# Patient Record
Sex: FEMALE | Race: WHITE | NOT HISPANIC OR LATINO
[De-identification: names, ages, dates, MRNs, and addresses within clinical notes are randomized per-mention and may not be internally consistent; named-entity substitution may affect disease eponyms.]

---

## 2018-11-30 ENCOUNTER — LABORATORY RESULT (OUTPATIENT)
Age: 53
End: 2018-11-30

## 2018-11-30 ENCOUNTER — APPOINTMENT (OUTPATIENT)
Dept: PULMONOLOGY | Facility: CLINIC | Age: 53
End: 2018-11-30
Payer: COMMERCIAL

## 2018-11-30 VITALS
OXYGEN SATURATION: 100 % | SYSTOLIC BLOOD PRESSURE: 109 MMHG | HEIGHT: 65 IN | WEIGHT: 180 LBS | HEART RATE: 75 BPM | DIASTOLIC BLOOD PRESSURE: 75 MMHG | BODY MASS INDEX: 29.99 KG/M2

## 2018-11-30 DIAGNOSIS — Z78.9 OTHER SPECIFIED HEALTH STATUS: ICD-10-CM

## 2018-11-30 DIAGNOSIS — Z87.891 PERSONAL HISTORY OF NICOTINE DEPENDENCE: ICD-10-CM

## 2018-11-30 LAB
ALBUMIN: 10
BILIRUB UR QL STRIP: NEGATIVE
CREATININE: <30
GLUCOSE UR-MCNC: NEGATIVE
HCG UR QL: 0.2 EU/DL
HGB UR QL STRIP.AUTO: NORMAL
KETONES UR-MCNC: NEGATIVE
LEUKOCYTE ESTERASE UR QL STRIP: NORMAL
MICROALBUMIN/CREAT UR TEST STR-RTO: 100
NITRITE UR QL STRIP: NEGATIVE
PH UR STRIP: 6.5
POCT - HEMOGLOBIN (HGB), QUANTITATIVE, TRANSCUTANEOUS: 12.8
PROT UR STRIP-MCNC: NEGATIVE
SP GR UR STRIP: 1.01

## 2018-11-30 PROCEDURE — 81003 URINALYSIS AUTO W/O SCOPE: CPT | Mod: QW

## 2018-11-30 PROCEDURE — 88738 HGB QUANT TRANSCUTANEOUS: CPT

## 2018-11-30 PROCEDURE — 94010 BREATHING CAPACITY TEST: CPT

## 2018-11-30 PROCEDURE — 82044 UR ALBUMIN SEMIQUANTITATIVE: CPT | Mod: QW

## 2018-11-30 PROCEDURE — 94729 DIFFUSING CAPACITY: CPT | Mod: 59

## 2018-11-30 PROCEDURE — 36415 COLL VENOUS BLD VENIPUNCTURE: CPT

## 2018-11-30 PROCEDURE — 93000 ELECTROCARDIOGRAM COMPLETE: CPT

## 2018-11-30 PROCEDURE — 82570 ASSAY OF URINE CREATININE: CPT | Mod: QW

## 2018-11-30 PROCEDURE — 71046 X-RAY EXAM CHEST 2 VIEWS: CPT

## 2018-11-30 PROCEDURE — 99396 PREV VISIT EST AGE 40-64: CPT | Mod: 25

## 2018-11-30 PROCEDURE — 77080 DXA BONE DENSITY AXIAL: CPT

## 2018-11-30 PROCEDURE — 94727 GAS DIL/WSHOT DETER LNG VOL: CPT

## 2018-12-01 LAB
25(OH)D3 SERPL-MCNC: 13 NG/ML
BASOPHILS # BLD AUTO: 0.02 K/UL
BASOPHILS NFR BLD AUTO: 0.3 %
EOSINOPHIL # BLD AUTO: 0.16 K/UL
EOSINOPHIL NFR BLD AUTO: 2.1 %
HBA1C MFR BLD HPLC: 5.3 %
HCT VFR BLD CALC: 42.1 %
HCV AB SER QL: NONREACTIVE
HCV S/CO RATIO: 0.24 S/CO
HGB BLD-MCNC: 13.8 G/DL
IMM GRANULOCYTES NFR BLD AUTO: 0.1 %
LYMPHOCYTES # BLD AUTO: 2.82 K/UL
LYMPHOCYTES NFR BLD AUTO: 37.4 %
MAN DIFF?: NORMAL
MCHC RBC-ENTMCNC: 30.2 PG
MCHC RBC-ENTMCNC: 32.8 GM/DL
MCV RBC AUTO: 92.1 FL
MONOCYTES # BLD AUTO: 0.4 K/UL
MONOCYTES NFR BLD AUTO: 5.3 %
NEUTROPHILS # BLD AUTO: 4.13 K/UL
NEUTROPHILS NFR BLD AUTO: 54.8 %
PLATELET # BLD AUTO: 288 K/UL
RBC # BLD: 4.57 M/UL
RBC # FLD: 13.7 %
T3 SERPL-MCNC: 122 NG/DL
T3RU NFR SERPL: 1.2 INDEX
T4 FREE SERPL-MCNC: 1.1 NG/DL
T4 SERPL-MCNC: 7.8 UG/DL
TSH SERPL-ACNC: 2.56 UIU/ML
WBC # FLD AUTO: 7.54 K/UL

## 2018-12-03 LAB
ALBUMIN SERPL ELPH-MCNC: 4.6 G/DL
ALP BLD-CCNC: 74 U/L
ALT SERPL-CCNC: 17 U/L
ANION GAP SERPL CALC-SCNC: 14 MMOL/L
AST SERPL-CCNC: 13 U/L
BACTERIA UR CULT: ABNORMAL
BILIRUB SERPL-MCNC: 0.2 MG/DL
BUN SERPL-MCNC: 9 MG/DL
CALCIUM SERPL-MCNC: 9.3 MG/DL
CHLORIDE SERPL-SCNC: 103 MMOL/L
CHOLEST SERPL-MCNC: 244 MG/DL
CHOLEST/HDLC SERPL: 4.6 RATIO
CO2 SERPL-SCNC: 26 MMOL/L
CREAT SERPL-MCNC: 0.7 MG/DL
GLUCOSE SERPL-MCNC: 86 MG/DL
HDLC SERPL-MCNC: 53 MG/DL
LDLC SERPL CALC-MCNC: 159 MG/DL
POTASSIUM SERPL-SCNC: 4.6 MMOL/L
PROT SERPL-MCNC: 7.2 G/DL
SODIUM SERPL-SCNC: 143 MMOL/L
TRIGL SERPL-MCNC: 161 MG/DL

## 2018-12-14 ENCOUNTER — APPOINTMENT (OUTPATIENT)
Dept: PULMONOLOGY | Facility: CLINIC | Age: 53
End: 2018-12-14
Payer: COMMERCIAL

## 2018-12-14 ENCOUNTER — RESULT CHARGE (OUTPATIENT)
Age: 53
End: 2018-12-14

## 2018-12-14 PROCEDURE — 95800 SLP STDY UNATTENDED: CPT

## 2018-12-15 PROCEDURE — 95800 SLP STDY UNATTENDED: CPT

## 2018-12-17 ENCOUNTER — FORM ENCOUNTER (OUTPATIENT)
Age: 53
End: 2018-12-17

## 2018-12-21 ENCOUNTER — APPOINTMENT (OUTPATIENT)
Dept: PULMONOLOGY | Facility: CLINIC | Age: 53
End: 2018-12-21
Payer: COMMERCIAL

## 2018-12-21 VITALS
HEART RATE: 77 BPM | RESPIRATION RATE: 12 BRPM | OXYGEN SATURATION: 97 % | TEMPERATURE: 97.8 F | DIASTOLIC BLOOD PRESSURE: 79 MMHG | SYSTOLIC BLOOD PRESSURE: 115 MMHG

## 2018-12-21 PROCEDURE — 99214 OFFICE O/P EST MOD 30 MIN: CPT

## 2019-03-04 ENCOUNTER — RESULT REVIEW (OUTPATIENT)
Age: 54
End: 2019-03-04

## 2019-03-06 ENCOUNTER — APPOINTMENT (OUTPATIENT)
Dept: PULMONOLOGY | Facility: CLINIC | Age: 54
End: 2019-03-06
Payer: COMMERCIAL

## 2019-03-06 ENCOUNTER — NON-APPOINTMENT (OUTPATIENT)
Age: 54
End: 2019-03-06

## 2019-03-06 VITALS
OXYGEN SATURATION: 99 % | TEMPERATURE: 97.7 F | SYSTOLIC BLOOD PRESSURE: 100 MMHG | HEART RATE: 107 BPM | DIASTOLIC BLOOD PRESSURE: 69 MMHG

## 2019-03-06 DIAGNOSIS — Z01.818 ENCOUNTER FOR OTHER PREPROCEDURAL EXAMINATION: ICD-10-CM

## 2019-03-06 LAB
BASOPHILS # BLD AUTO: 0.03 K/UL
BASOPHILS NFR BLD AUTO: 0.5 %
EOSINOPHIL # BLD AUTO: 0.17 K/UL
EOSINOPHIL NFR BLD AUTO: 2.8 %
HCT VFR BLD CALC: 41.6 %
HGB BLD-MCNC: 13.3 G/DL
IMM GRANULOCYTES NFR BLD AUTO: 0.3 %
LYMPHOCYTES # BLD AUTO: 1.85 K/UL
LYMPHOCYTES NFR BLD AUTO: 30.6 %
MAN DIFF?: NORMAL
MCHC RBC-ENTMCNC: 28.8 PG
MCHC RBC-ENTMCNC: 32 GM/DL
MCV RBC AUTO: 90 FL
MONOCYTES # BLD AUTO: 0.33 K/UL
MONOCYTES NFR BLD AUTO: 5.5 %
NEUTROPHILS # BLD AUTO: 3.65 K/UL
NEUTROPHILS NFR BLD AUTO: 60.3 %
PLATELET # BLD AUTO: 273 K/UL
RBC # BLD: 4.62 M/UL
RBC # FLD: 13.1 %
WBC # FLD AUTO: 6.05 K/UL

## 2019-03-06 PROCEDURE — 36415 COLL VENOUS BLD VENIPUNCTURE: CPT

## 2019-03-06 PROCEDURE — 99214 OFFICE O/P EST MOD 30 MIN: CPT | Mod: 25

## 2019-03-06 PROCEDURE — 93000 ELECTROCARDIOGRAM COMPLETE: CPT

## 2019-03-06 NOTE — PHYSICAL EXAM
[General Appearance - Well Developed] : well developed [Normal Appearance] : normal appearance [Well Groomed] : well groomed [General Appearance - Well Nourished] : well nourished [No Deformities] : no deformities [General Appearance - In No Acute Distress] : no acute distress [Normal Conjunctiva] : the conjunctiva exhibited no abnormalities [Eyelids - No Xanthelasma] : the eyelids demonstrated no xanthelasmas [Normal Oropharynx] : normal oropharynx [II] : II [Neck Appearance] : the appearance of the neck was normal [Neck Cervical Mass (___cm)] : no neck mass was observed [Jugular Venous Distention Increased] : there was no jugular-venous distention [Thyroid Diffuse Enlargement] : the thyroid was not enlarged [Thyroid Nodule] : there were no palpable thyroid nodules [Heart Rate And Rhythm] : heart rate and rhythm were normal [Heart Sounds] : normal S1 and S2 [Murmurs] : no murmurs present [Arterial Pulses Normal] : the arterial pulses were normal [Edema] : no peripheral edema present [Veins - Varicosity Changes] : no varicosital changes were noted in the lower extremities [Respiration, Rhythm And Depth] : normal respiratory rhythm and effort [Exaggerated Use Of Accessory Muscles For Inspiration] : no accessory muscle use [Auscultation Breath Sounds / Voice Sounds] : lungs were clear to auscultation bilaterally [Chest Palpation] : palpation of the chest revealed no abnormalities [Lungs Percussion] : the lungs were normal to percussion [Bowel Sounds] : normal bowel sounds [Abdomen Soft] : soft [Abdomen Tenderness] : non-tender [Abdomen Mass (___ Cm)] : no abdominal mass palpated [Abnormal Walk] : normal gait [Gait - Sufficient For Exercise Testing] : the gait was sufficient for exercise testing [Nail Clubbing] : no clubbing of the fingernails [Cyanosis, Localized] : no localized cyanosis [Petechial Hemorrhages (___cm)] : no petechial hemorrhages [] : no ischemic changes [Deep Tendon Reflexes (DTR)] : deep tendon reflexes were 2+ and symmetric [Sensation] : the sensory exam was normal to light touch and pinprick [Motor Exam] : the motor exam was normal [No Focal Deficits] : no focal deficits [Oriented To Time, Place, And Person] : oriented to person, place, and time [Impaired Insight] : insight and judgment were intact [Affect] : the affect was normal [Mood] : the mood was normal

## 2019-03-07 LAB
25(OH)D3 SERPL-MCNC: 59.3 NG/ML
ANION GAP SERPL CALC-SCNC: 15 MMOL/L
BUN SERPL-MCNC: 17 MG/DL
CALCIUM SERPL-MCNC: 9.6 MG/DL
CHLORIDE SERPL-SCNC: 102 MMOL/L
CO2 SERPL-SCNC: 22 MMOL/L
CREAT SERPL-MCNC: 0.7 MG/DL
GLUCOSE SERPL-MCNC: 104 MG/DL
POTASSIUM SERPL-SCNC: 4.6 MMOL/L
SODIUM SERPL-SCNC: 139 MMOL/L

## 2019-03-07 NOTE — DISCUSSION/SUMMARY
[FreeTextEntry1] : Patient was stereotactic breast biopsy completed 2/6/2019 line diagnosis for biopsy\par Minute focus of atypical ductal hyperplasia\par Fibrocystic change nonproliferative with microcalcification biopsy fibrocystic change nonproliferative\par  follow up with Gabriella Solitario MD breast surgery left pending \par Procedure March 14 2019\par \par \par \par ADDENDUM\par Breast biopsy pathology\par Nonproliferative fibrocystic change\par \par Insomina\par  Vit D insufficiency\par HLD\par \par SMA7 CBC Vitamin D level reviewed\par \par Patient is medically cleared for scheduled breast surgery

## 2019-03-07 NOTE — REASON FOR VISIT
[Preoperative Evaluation] : an preoperative evaluation [FreeTextEntry2] : Atypical Breast Hyperplasia Left

## 2019-03-07 NOTE — PROCEDURE
[FreeTextEntry1] : PFT no bronchodilator November 30, 2018\par Spirometry normal\par Lung volumes normal\par Diffusion normal\par Hemoglobin 12.8\par \par DEXA normal study follow-up 2 years\par Chest x-ray PA lateral projection November 30, 2018\par Cardiac size is normal\par Full paratracheal stripe\par Lung fields are clear\par No dominant pulmonary nodules parenchymal infiltrates pleural effusions pneumothorax\par Soft tissue bony structures normal\par \par EKG 3/6/2019\par  NSR\par  low precordial voltage\par Blood draw\par \par Data review March 7, 2019\par CBC normal range\par Serum electrolytes glucose 104\par BUN 19 creatinine 0.70 serum calcium 9.6 her potassium 4.6\par Vitamin D 59.3 normal range

## 2019-03-07 NOTE — HISTORY OF PRESENT ILLNESS
[Primary Insomnia] : primary insomnia [Date: ___] : Date of most recent diagnostic polysomnogram: [unfilled] [AHI: ___ per hour] : Apnea-hypopnea index:  [unfilled] per hour [FreeTextEntry1] : Preop; Breast surgery\par insomina\par  hx negative sleep study for [facility]\par completed Vit treatment

## 2019-03-14 ENCOUNTER — RESULT REVIEW (OUTPATIENT)
Age: 54
End: 2019-03-14

## 2019-03-22 ENCOUNTER — APPOINTMENT (OUTPATIENT)
Dept: PULMONOLOGY | Facility: CLINIC | Age: 54
End: 2019-03-22
Payer: COMMERCIAL

## 2019-03-22 VITALS
OXYGEN SATURATION: 94 % | SYSTOLIC BLOOD PRESSURE: 98 MMHG | TEMPERATURE: 98.1 F | HEART RATE: 87 BPM | RESPIRATION RATE: 16 BRPM | DIASTOLIC BLOOD PRESSURE: 63 MMHG

## 2019-03-22 PROCEDURE — 99213 OFFICE O/P EST LOW 20 MIN: CPT | Mod: 25

## 2019-03-22 PROCEDURE — 36415 COLL VENOUS BLD VENIPUNCTURE: CPT

## 2019-03-22 PROCEDURE — 71046 X-RAY EXAM CHEST 2 VIEWS: CPT

## 2019-03-23 LAB
ANION GAP SERPL CALC-SCNC: 13 MMOL/L
BASOPHILS # BLD AUTO: 0.04 K/UL
BASOPHILS NFR BLD AUTO: 0.5 %
BUN SERPL-MCNC: 12 MG/DL
CALCIUM SERPL-MCNC: 9.9 MG/DL
CHLORIDE SERPL-SCNC: 102 MMOL/L
CO2 SERPL-SCNC: 26 MMOL/L
CREAT SERPL-MCNC: 0.72 MG/DL
DEPRECATED D DIMER PPP IA-ACNC: <150 NG/ML DDU
EOSINOPHIL # BLD AUTO: 0.28 K/UL
EOSINOPHIL NFR BLD AUTO: 3.8 %
GLUCOSE SERPL-MCNC: 78 MG/DL
HCT VFR BLD CALC: 44.5 %
HGB BLD-MCNC: 14 G/DL
IMM GRANULOCYTES NFR BLD AUTO: 0.3 %
LYMPHOCYTES # BLD AUTO: 2.5 K/UL
LYMPHOCYTES NFR BLD AUTO: 33.8 %
MAN DIFF?: NORMAL
MCHC RBC-ENTMCNC: 29.2 PG
MCHC RBC-ENTMCNC: 31.5 GM/DL
MCV RBC AUTO: 92.9 FL
MONOCYTES # BLD AUTO: 0.41 K/UL
MONOCYTES NFR BLD AUTO: 5.5 %
NEUTROPHILS # BLD AUTO: 4.14 K/UL
NEUTROPHILS NFR BLD AUTO: 56.1 %
PLATELET # BLD AUTO: 336 K/UL
POTASSIUM SERPL-SCNC: 4.5 MMOL/L
RBC # BLD: 4.79 M/UL
RBC # FLD: 13 %
SODIUM SERPL-SCNC: 141 MMOL/L
WBC # FLD AUTO: 7.39 K/UL

## 2019-03-23 NOTE — DISCUSSION/SUMMARY
[FreeTextEntry1] : Patient was stereotactic breast biopsy completed 2/6/2019 diagnosis for biopsy\par Minute focus of atypical ductal hyperplasia\par Fibrocystic change nonproliferative with microcalcification biopsy fibrocystic change nonproliferative\par \par ADDENDUM\par Breast biopsy pathology\par Nonproliferative fibrocystic change\par \par post Op atypical left chest wall vs  breast \par no evidence of breast infection or palpable hematoma\par veery low suspicion PE\par  but will check D Dimer\par \par \par

## 2019-03-23 NOTE — REASON FOR VISIT
[Follow-Up] : a follow-up visit [FreeTextEntry2] : Post Op breast surgery Vit D julianne,Sleep Disorder

## 2019-03-23 NOTE — HISTORY OF PRESENT ILLNESS
[Improved] : have improved [None] : The patient is not currently on any medications [Frequent Nocturnal Awakening] : frequent nocturnal awakening [Daytime Somnolence] : daytime somnolence [Unintentional Sleep While Inactive] : unintentional sleep while inactive [Awakes Unrefreshed] : awakening unrefreshed [Awakes with Headache] : headache upon awakening [Awakening With Dry Mouth] : awakening with dry mouth [Snoring] : no snoring [Witnessed Apneas] : no witnessed sleep apnea [FreeTextEntry1] : very atypical fleeting type sharp pain at left lateral breast \par  not pleuritic in  nature \par  No SOB RIVERO palpitations\par site of discomfort not at site of breast surgery

## 2019-03-23 NOTE — PROCEDURE
[FreeTextEntry1] : PFT no bronchodilator November 30, 2018\par Spirometry normal\par Lung volumes normal\par Diffusion normal\par Hemoglobin 12.8\par \par DEXA normal study follow-up 2 years\par \par Chest x-ray PA lateral projection 3/22/2019\par Cardiac size is normal\par Full paratracheal stripe\par Lung fields are clear\par No dominant pulmonary nodules parenchymal infiltrates pleural effusions pneumothorax\par Soft tissue bony structures normal\par \par EKG 3/6/2019\par  NSR\par  low precordial voltage\par  Blod draw\par  CBC SMA7 D Dimer\par Data review March 7, 2019\par CBC normal range\par Serum electrolytes glucose 104\par BUN 19 creatinine 0.70 serum calcium 9.6 her potassium 4.6\par Vitamin D 59.3 normal range\par \par DATA review 2/23/2019\par CBC normal range\par D-dimer negative less than 150\par Serum electrolytes normal\par BUN 12 creatinine 0.72

## 2019-04-26 ENCOUNTER — APPOINTMENT (OUTPATIENT)
Dept: PULMONOLOGY | Facility: CLINIC | Age: 54
End: 2019-04-26

## 2019-05-16 ENCOUNTER — TRANSCRIPTION ENCOUNTER (OUTPATIENT)
Age: 54
End: 2019-05-16

## 2019-05-16 ENCOUNTER — APPOINTMENT (OUTPATIENT)
Dept: GASTROENTEROLOGY | Facility: CLINIC | Age: 54
End: 2019-05-16
Payer: COMMERCIAL

## 2019-05-16 VITALS
WEIGHT: 190 LBS | BODY MASS INDEX: 31.65 KG/M2 | HEIGHT: 65 IN | SYSTOLIC BLOOD PRESSURE: 107 MMHG | HEART RATE: 81 BPM | DIASTOLIC BLOOD PRESSURE: 82 MMHG

## 2019-05-16 DIAGNOSIS — Z80.52 FAMILY HISTORY OF MALIGNANT NEOPLASM OF BLADDER: ICD-10-CM

## 2019-05-16 DIAGNOSIS — Z12.11 ENCOUNTER FOR SCREENING FOR MALIGNANT NEOPLASM OF COLON: ICD-10-CM

## 2019-05-16 DIAGNOSIS — Z82.3 FAMILY HISTORY OF STROKE: ICD-10-CM

## 2019-05-16 DIAGNOSIS — Z82.49 FAMILY HISTORY OF ISCHEMIC HEART DISEASE AND OTHER DISEASES OF THE CIRCULATORY SYSTEM: ICD-10-CM

## 2019-05-16 DIAGNOSIS — K58.0 IRRITABLE BOWEL SYNDROME WITH DIARRHEA: ICD-10-CM

## 2019-05-16 DIAGNOSIS — Z80.51 FAMILY HISTORY OF MALIGNANT NEOPLASM OF KIDNEY: ICD-10-CM

## 2019-05-16 DIAGNOSIS — Z83.3 FAMILY HISTORY OF DIABETES MELLITUS: ICD-10-CM

## 2019-05-16 PROCEDURE — 99244 OFF/OP CNSLTJ NEW/EST MOD 40: CPT

## 2019-05-16 PROCEDURE — 82274 ASSAY TEST FOR BLOOD FECAL: CPT | Mod: QW

## 2019-05-16 NOTE — ASSESSMENT
[FreeTextEntry1] : 1. Reportedly negative colonoscopy approximately 2009--rule out colorectal neoplasm. Occasional urgency, loose stools likely from IBS.\par 2. Chronic GERD--rule out erosive esophagitis, Chawla's, hiatal hernia.\par 3. Obesity.\par 4. Hyperlipidemia.\par 5. Ex-smoker.\par 6. Insomnia.\par 7. Status post cholecystectomy.\par \par Plan:\par 1. Medical records, including labs, reviewed.\par 2. [facility] brochure on "GERD" given and discussed.\par 3. Agree with screening colonoscopy, with EGD to follow-- Procedures, rationale, alternatives, material risks, anesthesia plan, MiraLax prep instructions were reviewed and brochures given.\par 4. Other recommendations depend on endoscopy results.

## 2019-05-16 NOTE — PHYSICAL EXAM
[General Appearance - Alert] : alert [General Appearance - Well Nourished] : well nourished [General Appearance - In No Acute Distress] : in no acute distress [General Appearance - Well Developed] : well developed [Outer Ear] : the ears and nose were normal in appearance [Sclera] : the sclera and conjunctiva were normal [Oropharynx] : the oropharynx was normal [Neck Cervical Mass (___cm)] : no neck mass was observed [Neck Appearance] : the appearance of the neck was normal [Jugular Venous Distention Increased] : there was no jugular-venous distention [Thyroid Diffuse Enlargement] : the thyroid was not enlarged [Thyroid Nodule] : there were no palpable thyroid nodules [Heart Sounds] : normal S1 and S2 [Heart Rate And Rhythm] : heart rate was normal and rhythm regular [Auscultation Breath Sounds / Voice Sounds] : lungs were clear to auscultation bilaterally [Heart Sounds Gallop] : no gallops [Murmurs] : no murmurs [Heart Sounds Pericardial Friction Rub] : no pericardial rub [Full Pulse] : the pedal pulses are present [Edema] : there was no peripheral edema [Bowel Sounds] : normal bowel sounds [Abdomen Soft] : soft [Abdomen Tenderness] : non-tender [Abdomen Mass (___ Cm)] : no abdominal mass palpated [Abdomen Hernia] : no hernia was discovered [Normal Sphincter Tone] : normal sphincter tone [No Rectal Mass] : no rectal mass [Internal Hemorrhoid] : no internal hemorrhoids [Occult Blood Positive] : stool was negative for occult blood [External Hemorrhoid] : no external hemorrhoids [FreeTextEntry1] : FIT negative [Cervical Lymph Nodes Enlarged Posterior Bilaterally] : posterior cervical [Axillary Lymph Nodes Enlarged Bilaterally] : axillary [Cervical Lymph Nodes Enlarged Anterior Bilaterally] : anterior cervical [Supraclavicular Lymph Nodes Enlarged Bilaterally] : supraclavicular [Inguinal Lymph Nodes Enlarged Bilaterally] : inguinal [Femoral Lymph Nodes Enlarged Bilaterally] : femoral [No CVA Tenderness] : no ~M costovertebral angle tenderness [Abnormal Walk] : normal gait [No Spinal Tenderness] : no spinal tenderness [Musculoskeletal - Swelling] : no joint swelling seen [Nail Clubbing] : no clubbing  or cyanosis of the fingernails [Motor Tone] : muscle strength and tone were normal [Skin Color & Pigmentation] : normal skin color and pigmentation [Skin Turgor] : normal skin turgor [Impaired Insight] : insight and judgment were intact [Oriented To Time, Place, And Person] : oriented to person, place, and time [] : no rash [Affect] : the affect was normal

## 2019-05-16 NOTE — HISTORY OF PRESENT ILLNESS
[FreeTextEntry1] : Baseline colonoscopy approximately 10 years ago was reportedly negative. Mignon is more likely to experience fecal urgency and loose stools after dining out at a restaurant; her bowels are otherwise generally regular. She has had long-standing reflux and indigestion, at least twice per week, for which she takes Phazyme as needed. She has never undergone endoscopic evaluation.

## 2019-05-16 NOTE — CONSULT LETTER
[Consult Letter:] : I had the pleasure of evaluating your patient, [unfilled]. [Dear  ___] : Dear  [unfilled], [Please see my note below.] : Please see my note below. [Consult Closing:] : Thank you very much for allowing me to participate in the care of this patient.  If you have any questions, please do not hesitate to contact me. [Sincerely,] : Sincerely, [FreeTextEntry3] : Isak Lane M.D.\par

## 2019-05-16 NOTE — REVIEW OF SYSTEMS
[Chills] : chills [Fever] : fever [Diarrhea] : diarrhea [Feeling Tired] : feeling tired [Heartburn] : heartburn [Joint Swelling] : joint swelling [Joint Pain] : joint pain [Joint Stiffness] : joint stiffness [Negative] : Heme/Lymph [As Noted in HPI] : as noted in HPI

## 2019-05-23 ENCOUNTER — APPOINTMENT (OUTPATIENT)
Dept: PULMONOLOGY | Facility: CLINIC | Age: 54
End: 2019-05-23
Payer: COMMERCIAL

## 2019-05-23 VITALS
HEART RATE: 95 BPM | SYSTOLIC BLOOD PRESSURE: 129 MMHG | OXYGEN SATURATION: 99 % | DIASTOLIC BLOOD PRESSURE: 78 MMHG | TEMPERATURE: 99.5 F

## 2019-05-23 DIAGNOSIS — Z87.09 PERSONAL HISTORY OF OTHER DISEASES OF THE RESPIRATORY SYSTEM: ICD-10-CM

## 2019-05-23 LAB — S PYO AG SPEC QL IA: NORMAL

## 2019-05-23 PROCEDURE — 94060 EVALUATION OF WHEEZING: CPT

## 2019-05-23 PROCEDURE — 71046 X-RAY EXAM CHEST 2 VIEWS: CPT

## 2019-05-23 PROCEDURE — 95012 NITRIC OXIDE EXP GAS DETER: CPT

## 2019-05-23 PROCEDURE — 87880 STREP A ASSAY W/OPTIC: CPT | Mod: QW

## 2019-05-23 PROCEDURE — 94664 DEMO&/EVAL PT USE INHALER: CPT | Mod: 59

## 2019-05-23 PROCEDURE — 99214 OFFICE O/P EST MOD 30 MIN: CPT | Mod: 25

## 2019-05-23 RX ORDER — ALBUTEROL SULFATE 90 UG/1
108 (90 BASE) AEROSOL, METERED RESPIRATORY (INHALATION)
Qty: 1 | Refills: 3 | Status: ACTIVE | COMMUNITY
Start: 2019-05-23 | End: 1900-01-01

## 2019-05-23 NOTE — HISTORY OF PRESENT ILLNESS
[Improved] : have improved [Cough] : worsened coughing [Wheezing] : worsened wheezing [None] : The patient is not currently on any medications [FreeTextEntry1] : Productive cough, runny nose, loss of voice, and sore throat x 2 weeks. Taking Day/Night Quil which offered minimal relief.

## 2019-05-23 NOTE — PROCEDURE
[FreeTextEntry1] : Rapid strep negative\par Ergometry May 23, 2019\par Mild obstructive ventilatory impairment\par Borderline 10% response to inhaled bronchodilator at the FEV1\par Flow rates compared to November 30, 2018 demonstrate a significant reduction with the FEV1 reduce from 2.68 to 1.89 L\par \par Chest x-ray PA lateral projection May 23, 2019\par Cardiac size normal\par Left lower lung zone discoid linear atelectasis\par Clear lungs without evidence for pneumonia\par No parenchymal infiltrates pleural effusions dominant pulmonary nodules\par Data chest x-ray of March 22, 2019 the left lower lung zone discoid atelectasis is new\par \par PFT no bronchodilator November 30, 2018\par Spirometry normal\par Lung volumes normal\par Diffusion normal\par Hemoglobin 12.8\par \par DEXA normal study follow-up 2 years\par \par Chest x-ray PA lateral projection 3/22/2019\par Cardiac size is normal\par Full paratracheal stripe\par Lung fields are clear\par No dominant pulmonary nodules parenchymal infiltrates pleural effusions pneumothorax\par Soft tissue bony structures normal\par \par EKG 3/6/2019\par  NSR\par  low precordial voltage\par  Blod draw\par  CBC SMA7 D Dimer\par Data review March 7, 2019\par CBC normal range\par Serum electrolytes glucose 104\par BUN 19 creatinine 0.70 serum calcium 9.6 her potassium 4.6\par Vitamin D 59.3 normal range\par \par DATA review 2/23/2019\par CBC normal range\par D-dimer negative less than 150\par Serum electrolytes normal\par BUN 12 creatinine 0.72

## 2019-06-03 ENCOUNTER — APPOINTMENT (OUTPATIENT)
Dept: GASTROENTEROLOGY | Facility: CLINIC | Age: 54
End: 2019-06-03
Payer: COMMERCIAL

## 2019-06-03 ENCOUNTER — LABORATORY RESULT (OUTPATIENT)
Age: 54
End: 2019-06-03

## 2019-06-03 ENCOUNTER — RX CHANGE (OUTPATIENT)
Age: 54
End: 2019-06-03

## 2019-06-03 PROCEDURE — 45380 COLONOSCOPY AND BIOPSY: CPT

## 2019-06-03 PROCEDURE — 43239 EGD BIOPSY SINGLE/MULTIPLE: CPT | Mod: 59

## 2019-06-03 PROCEDURE — 84703 CHORIONIC GONADOTROPIN ASSAY: CPT | Mod: 59,QW

## 2019-06-03 RX ORDER — AZITHROMYCIN 250 MG/1
250 TABLET, FILM COATED ORAL DAILY
Qty: 10 | Refills: 0 | Status: DISCONTINUED | COMMUNITY
Start: 2019-05-23 | End: 2019-06-03

## 2019-07-03 ENCOUNTER — APPOINTMENT (OUTPATIENT)
Dept: PULMONOLOGY | Facility: CLINIC | Age: 54
End: 2019-07-03
Payer: COMMERCIAL

## 2019-07-03 VITALS — DIASTOLIC BLOOD PRESSURE: 71 MMHG | OXYGEN SATURATION: 98 % | HEART RATE: 76 BPM | SYSTOLIC BLOOD PRESSURE: 104 MMHG

## 2019-07-03 VITALS — OXYGEN SATURATION: 98 % | RESPIRATION RATE: 14 BRPM

## 2019-07-03 PROCEDURE — 94060 EVALUATION OF WHEEZING: CPT

## 2019-07-03 PROCEDURE — 94727 GAS DIL/WSHOT DETER LNG VOL: CPT

## 2019-07-03 PROCEDURE — 99213 OFFICE O/P EST LOW 20 MIN: CPT | Mod: 25

## 2019-07-03 PROCEDURE — 95012 NITRIC OXIDE EXP GAS DETER: CPT

## 2019-07-03 PROCEDURE — 94729 DIFFUSING CAPACITY: CPT

## 2019-07-03 RX ORDER — METHYLPREDNISOLONE 4 MG/1
4 TABLET ORAL
Qty: 1 | Refills: 0 | Status: DISCONTINUED | COMMUNITY
Start: 2019-05-23 | End: 2019-07-03

## 2019-07-03 RX ORDER — BENZONATATE 100 MG/1
100 CAPSULE ORAL
Qty: 30 | Refills: 3 | Status: DISCONTINUED | COMMUNITY
Start: 2019-05-23 | End: 2019-07-03

## 2019-07-03 NOTE — PHYSICAL EXAM
[Normal Appearance] : normal appearance [General Appearance - Well Developed] : well developed [Well Groomed] : well groomed [General Appearance - In No Acute Distress] : no acute distress [General Appearance - Well Nourished] : well nourished [No Deformities] : no deformities [Normal Conjunctiva] : the conjunctiva exhibited no abnormalities [Eyelids - No Xanthelasma] : the eyelids demonstrated no xanthelasmas [II] : II [Normal Oropharynx] : normal oropharynx [Neck Appearance] : the appearance of the neck was normal [Neck Cervical Mass (___cm)] : no neck mass was observed [Jugular Venous Distention Increased] : there was no jugular-venous distention [Thyroid Diffuse Enlargement] : the thyroid was not enlarged [Thyroid Nodule] : there were no palpable thyroid nodules [Heart Rate And Rhythm] : heart rate and rhythm were normal [Murmurs] : no murmurs present [Heart Sounds] : normal S1 and S2 [Edema] : no peripheral edema present [Veins - Varicosity Changes] : no varicosital changes were noted in the lower extremities [Arterial Pulses Normal] : the arterial pulses were normal [Respiration, Rhythm And Depth] : normal respiratory rhythm and effort [Chest Palpation] : palpation of the chest revealed no abnormalities [Auscultation Breath Sounds / Voice Sounds] : lungs were clear to auscultation bilaterally [Exaggerated Use Of Accessory Muscles For Inspiration] : no accessory muscle use [Lungs Percussion] : the lungs were normal to percussion [Bowel Sounds] : normal bowel sounds [Abdomen Tenderness] : non-tender [Abdomen Soft] : soft [Abdomen Mass (___ Cm)] : no abdominal mass palpated [Abnormal Walk] : normal gait [Gait - Sufficient For Exercise Testing] : the gait was sufficient for exercise testing [Cyanosis, Localized] : no localized cyanosis [Nail Clubbing] : no clubbing of the fingernails [Petechial Hemorrhages (___cm)] : no petechial hemorrhages [Sensation] : the sensory exam was normal to light touch and pinprick [] : no ischemic changes [Deep Tendon Reflexes (DTR)] : deep tendon reflexes were 2+ and symmetric [Motor Exam] : the motor exam was normal [No Focal Deficits] : no focal deficits [Oriented To Time, Place, And Person] : oriented to person, place, and time [Affect] : the affect was normal [Impaired Insight] : insight and judgment were intact [Mood] : the mood was normal

## 2019-07-03 NOTE — HISTORY OF PRESENT ILLNESS
[Cough] : worsened coughing [Improved] : have improved [None] : The patient is not currently on any medications [Wheezing] : worsened wheezing [Frequent Nocturnal Awakening] : frequent nocturnal awakening [Daytime Somnolence] : daytime somnolence [Unintentional Sleep While Inactive] : unintentional sleep while inactive [Awakes Unrefreshed] : awakening unrefreshed [Awakes with Headache] : headache upon awakening [Awakening With Dry Mouth] : awakening with dry mouth [Snoring] : no snoring [Witnessed Apneas] : no witnessed sleep apnea [FreeTextEntry1] : minimal residual cough only with deep breath \par completed colonoscopy EGD without respiratory complications\par no wheeze\par  Denies sputum\par  No fever chills sweats

## 2019-07-03 NOTE — DISCUSSION/SUMMARY
[FreeTextEntry1] : Patient was stereotactic breast biopsy completed 2/6/2019 line diagnosis for biopsy\par Minute focus of atypical ductal hyperplasia\par Fibrocystic change nonproliferative with microcalcification biopsy fibrocystic change nonproliferative\par \par ADDENDUM\par Breast biopsy pathology\par Nonproliferative fibrocystic change\par Status post, post viral bronchitic syndrome \par And to veil improvement of pulmonary flow rates as noted\par Discoid atelectasis left lower lung zone\par \par Treatment protocol completed\par Doxycycline 100 mg 1 tablet p.o. twice daily\par Medrol Dosepak\par PRN Pro Air rescue inhaler to be used\par

## 2019-07-03 NOTE — PROCEDURE
[FreeTextEntry1] : Exhaled nitric oxide 18 to be be within normal limits\par PFT July 3, 2019\par Flow rates normal\par Ratio 76\par No response to bronchodilator at FEV1\par Normal lung volumes without air trapping\par Diffusion normal 89% predicted\par Hemoglobin 13.3\par Significant interval improvement of flow rates\par FEV1 improved from 1.89 to 2.75 L\par FVC improving 2.7 63.60 L\par \par Chest x-ray PA lateral projection May 23, 2019\par Cardiac size normal\par Left lower lung zone discoid linear atelectasis\par Clear lungs without evidence for pneumonia\par No parenchymal infiltrates pleural effusions dominant pulmonary nodules\par Data chest x-ray of March 22, 2019 the left lower lung zone discoid atelectasis is new\par \par \par DEXA normal study follow-up 2 years Nov 2020\par \par \par EKG 3/6/2019\par  NSR\par  low precordial voltage\par  Blood draw\par  CBC SMA7 D Dimer\par Data review March 7, 2019\par CBC normal range\par Serum electrolytes glucose 104\par BUN 19 creatinine 0.70 serum calcium 9.6 her potassium 4.6\par Vitamin D 59.3 normal range\par \par DATA review 2/23/2019\par CBC normal range\par D-dimer negative less than 150\par Serum electrolytes normal\par BUN 12 creatinine 0.72

## 2020-11-23 DIAGNOSIS — Z20.828 CONTACT WITH AND (SUSPECTED) EXPOSURE TO OTHER VIRAL COMMUNICABLE DISEASES: ICD-10-CM

## 2020-11-25 LAB — SARS-COV-2 N GENE NPH QL NAA+PROBE: NOT DETECTED

## 2020-12-14 ENCOUNTER — APPOINTMENT (OUTPATIENT)
Dept: PULMONOLOGY | Facility: CLINIC | Age: 55
End: 2020-12-14
Payer: COMMERCIAL

## 2020-12-14 PROCEDURE — 99213 OFFICE O/P EST LOW 20 MIN: CPT | Mod: 95

## 2020-12-14 RX ORDER — OMEPRAZOLE 20 MG/1
20 CAPSULE, DELAYED RELEASE ORAL
Qty: 90 | Refills: 3 | Status: DISCONTINUED | COMMUNITY
Start: 2019-06-03 | End: 2020-12-14

## 2020-12-14 RX ORDER — TRAZODONE HYDROCHLORIDE 50 MG/1
50 TABLET ORAL
Qty: 30 | Refills: 0 | Status: ACTIVE | COMMUNITY
Start: 2020-12-14

## 2020-12-14 NOTE — REVIEW OF SYSTEMS
[Cough] : cough [SOB on Exertion] : sob on exertion [Negative] : Musculoskeletal [Fever] : no fever [Fatigue] : no fatigue [Chills] : no chills [Poor Appetite] : no poor appetite

## 2020-12-14 NOTE — HISTORY OF PRESENT ILLNESS
[Home] : at home, [unfilled] , at the time of the visit. [Medical Office: ([facility])___] : at the medical office located in  [Verbal consent obtained from patient] : the patient, [unfilled] [TextBox_4] : Patient with fever and  Covid positive\par Patient was tested last Wednesday, December 9 also COVID-19 PCR was reported positive\par Her only symptom has been predominantly a dry cough\par Some dyspnea on exertion with stair climbing\par No fevers chills or sweats\par No purulent sputum\par No severe headaches\par No skin rashes\par No chest pain chest pain chest tightness or wheezing\par Taste and smell intact\par No GI symptoms\par O2 saturation checks at home have remained above 96%\par No reported tachycardia\par

## 2020-12-14 NOTE — DISCUSSION/SUMMARY
[FreeTextEntry1] : COVID-19 infection\par Reviewed patient's history\par Does not have qualification for monoclonal antibody infusion\par Ordered Covid bundle\par Patient understands need to continue to check O2 saturations and they are not remain less than 93% without notification ER evaluation 911\par Call with any other symptomatology that is progressive\par Develop severe chest pain shortness of breath immediately go to the emergency department\par Aspirin 81 mg x 30 days 45 days\par Eventually will need office follow-up visit with PFT\par

## 2020-12-16 ENCOUNTER — LABORATORY RESULT (OUTPATIENT)
Age: 55
End: 2020-12-16

## 2020-12-16 ENCOUNTER — NON-APPOINTMENT (OUTPATIENT)
Age: 55
End: 2020-12-16

## 2020-12-16 LAB
ALBUMIN SERPL ELPH-MCNC: 4.5 G/DL
ALP BLD-CCNC: 100 U/L
ALT SERPL-CCNC: 20 U/L
ANION GAP SERPL CALC-SCNC: 15 MMOL/L
AST SERPL-CCNC: 16 U/L
BASOPHILS # BLD AUTO: 0.02 K/UL
BASOPHILS NFR BLD AUTO: 0.3 %
BILIRUB SERPL-MCNC: 0.3 MG/DL
BUN SERPL-MCNC: 10 MG/DL
CALCIUM SERPL-MCNC: 9.8 MG/DL
CHLORIDE SERPL-SCNC: 103 MMOL/L
CO2 SERPL-SCNC: 25 MMOL/L
CREAT SERPL-MCNC: 1.02 MG/DL
CRP SERPL-MCNC: 0.16 MG/DL
EOSINOPHIL # BLD AUTO: 0.17 K/UL
EOSINOPHIL NFR BLD AUTO: 2.7 %
FERRITIN SERPL-MCNC: 180 NG/ML
GLUCOSE SERPL-MCNC: 96 MG/DL
HCT VFR BLD CALC: 44 %
HGB BLD-MCNC: 14.3 G/DL
IMM GRANULOCYTES NFR BLD AUTO: 0.3 %
LYMPHOCYTES # BLD AUTO: 2.56 K/UL
LYMPHOCYTES NFR BLD AUTO: 40.3 %
MAN DIFF?: NORMAL
MCHC RBC-ENTMCNC: 28.7 PG
MCHC RBC-ENTMCNC: 32.5 GM/DL
MCV RBC AUTO: 88.2 FL
MONOCYTES # BLD AUTO: 0.29 K/UL
MONOCYTES NFR BLD AUTO: 4.6 %
NEUTROPHILS # BLD AUTO: 3.3 K/UL
NEUTROPHILS NFR BLD AUTO: 51.8 %
PLATELET # BLD AUTO: 310 K/UL
POTASSIUM SERPL-SCNC: 4.8 MMOL/L
PROCALCITONIN SERPL-MCNC: 0.04 NG/ML
PROT SERPL-MCNC: 7.2 G/DL
RBC # BLD: 4.99 M/UL
RBC # FLD: 12.6 %
SODIUM SERPL-SCNC: 143 MMOL/L
WBC # FLD AUTO: 6.36 K/UL

## 2020-12-21 PROBLEM — Z87.09 HISTORY OF PHARYNGITIS: Status: RESOLVED | Noted: 2019-05-23 | Resolved: 2020-12-21

## 2021-03-08 NOTE — DISCUSSION/SUMMARY
Assessment/Plan:    Problem List Items Addressed This Visit        Respiratory    Acute non-recurrent frontal sinusitis - Primary     · Start augmentin, zyrtec, and prednisone            Nervous and Auditory    Bilateral otitis media     · Bilateral ear redness  · Start ciprodex            Other    Nasal congestion     · With post nasal drip  · Start augmentin, zyrtec, prednisone              Diagnoses and all orders for this visit:    Acute non-recurrent frontal sinusitis    Nasal congestion    Bilateral otitis media, unspecified otitis media type    Other orders  -     loratadine (CLARITIN) 10 mg tablet; Take 10 mg by mouth daily     Bilateral otitis media  · Bilateral ear redness  · Start ciprodex    Nasal congestion  · With post nasal drip  · Start augmentin, zyrtec, prednisone    Acute non-recurrent frontal sinusitis  · Start augmentin, zyrtec, and prednisone      Subjective:      Patient ID: Katelynn Porras is a 65 y o  male  Sinus Pain  Patient complains of clear rhinorrhea, congestion, facial pain, headaches, itchy eyes, mouth breathing, nasal congestion, post nasal drip, sinus pressure, sniffing and sore throat  Onset of symptoms was 3 days ago  Symptoms have been gradually worsening since that time  He is drinking plenty of fluids  Past history is significant for nothing  Patient is non-smoker  The following portions of the patient's history were reviewed and updated as appropriate:   Past Medical History:  He has a past medical history of Arthritis, Hyperlipidemia, Hypertension, and Obesity  ,  _______________________________________________________________________  Medical Problems:  does not have any pertinent problems on file ,  _______________________________________________________________________  Past Surgical History:   has a past surgical history that includes Knee surgery; Hand surgery (Bilateral); Wrist surgery; Elbow surgery;  Colonoscopy; and Cardiac [FreeTextEntry1] : Patient was stereotactic breast biopsy completed 2/6/2019 line diagnosis for biopsy\par Minute focus of atypical ductal hyperplasia\par Fibrocystic change nonproliferative with microcalcification biopsy fibrocystic change nonproliferative\par \par ADDENDUM\par Breast biopsy pathology\par Nonproliferative fibrocystic change\par \par Somatic bronchitis Evangelista viral\par Discoid atelectasis left lower lung zone\par \par Treatment protocol\par Doxycycline 100 mg 1 tablet p.o. twice daily\par Medrol Dosepak\par PRN Pro Air rescue inhaler to be used\par Office follow-up i 1 month\par MDI Instruction surgery  ,  _______________________________________________________________________  Family History:  family history includes Diabetes in his sister; Heart disease in his father and paternal grandmother; Kidney disease in his sister; Lupus in his mother; Rheum arthritis in his mother ,  _______________________________________________________________________  Social History:   reports that he has quit smoking  His smoking use included cigarettes  He quit after 10 00 years of use  He has never used smokeless tobacco  He reports current alcohol use  He reports that he does not use drugs  ,  _______________________________________________________________________  Allergies:  is allergic to other     _______________________________________________________________________  Current Outpatient Medications   Medication Sig Dispense Refill    aspirin 81 MG tablet Take 81 mg by mouth daily      diphenhydrAMINE (BENADRYL) 25 mg tablet Take 25 mg by mouth daily at bedtime as needed for itching      ergocalciferol (VITAMIN D2) 50,000 units Take 1 capsule (50,000 Units total) by mouth once a week 4 capsule 3    lisinopril-hydrochlorothiazide (PRINZIDE,ZESTORETIC) 20-25 MG per tablet Take 1 tablet by mouth daily 90 tablet 1    loratadine (CLARITIN) 10 mg tablet Take 10 mg by mouth daily      pravastatin (PRAVACHOL) 40 mg tablet Take 1 tablet (40 mg total) by mouth daily 90 tablet 1    metoprolol tartrate (LOPRESSOR) 25 mg tablet TAKE 1 TABLET BY MOUTH EVERY 12 HOURS (Patient not taking: Reported on 2/15/2021) 180 tablet 1     No current facility-administered medications for this visit       _______________________________________________________________________  Review of Systems   Constitutional: Negative for activity change, chills, fatigue and fever  HENT: Positive for congestion, ear pain, postnasal drip, rhinorrhea, sinus pressure, sinus pain, sneezing and sore throat  Eyes: Positive for itching  Negative for pain  Respiratory: Negative for cough and shortness of breath  Cardiovascular: Negative for chest pain, palpitations and leg swelling  Gastrointestinal: Negative for abdominal pain, constipation, diarrhea, nausea and vomiting  Genitourinary: Negative for difficulty urinating, flank pain, frequency and urgency  Musculoskeletal: Negative for gait problem, joint swelling and myalgias  Skin: Negative for color change  Neurological: Positive for headaches  Negative for dizziness, weakness and light-headedness  Psychiatric/Behavioral: Negative for sleep disturbance  The patient is not nervous/anxious  All other systems reviewed and are negative  Objective:  Vitals:    03/08/21 1558   BP: 136/70   BP Location: Left arm   Patient Position: Sitting   Cuff Size: Large   Pulse: 103   Resp: 16   Temp: 98 °F (36 7 °C)   SpO2: 98%   Weight: 121 kg (267 lb 9 6 oz)   Height: 5' 11" (1 803 m)     Body mass index is 37 32 kg/m²  Physical Exam  Vitals signs reviewed  Constitutional:       General: He is awake  Appearance: Normal appearance  He is well-developed  HENT:      Head: Normocephalic and atraumatic  Right Ear: Tenderness present  Tympanic membrane is erythematous  Left Ear: Tenderness present  Tympanic membrane is erythematous  Nose: Nose normal       Mouth/Throat:      Mouth: Mucous membranes are moist       Pharynx: Posterior oropharyngeal erythema present  Eyes:      Extraocular Movements: Extraocular movements intact  Neck:      Musculoskeletal: Normal range of motion  Cardiovascular:      Rate and Rhythm: Normal rate and regular rhythm  Pulses: Normal pulses  Heart sounds: Normal heart sounds  Pulmonary:      Effort: Pulmonary effort is normal       Breath sounds: Normal breath sounds  Abdominal:      General: Bowel sounds are normal       Palpations: Abdomen is soft  Musculoskeletal: Normal range of motion  Right lower leg: No edema        Left lower leg: No edema  Skin:     General: Skin is warm and dry  Neurological:      Mental Status: He is alert and oriented to person, place, and time  Psychiatric:         Attention and Perception: Attention normal          Mood and Affect: Mood normal          Speech: Speech normal          Behavior: Behavior normal  Behavior is cooperative             PHQ-9 Depression Screening    PHQ-9:   Frequency of the following problems over the past two weeks:

## 2022-01-07 ENCOUNTER — NON-APPOINTMENT (OUTPATIENT)
Age: 57
End: 2022-01-07

## 2022-01-07 ENCOUNTER — APPOINTMENT (OUTPATIENT)
Dept: PULMONOLOGY | Facility: CLINIC | Age: 57
End: 2022-01-07
Payer: COMMERCIAL

## 2022-01-07 VITALS
TEMPERATURE: 97.3 F | DIASTOLIC BLOOD PRESSURE: 86 MMHG | SYSTOLIC BLOOD PRESSURE: 128 MMHG | HEART RATE: 96 BPM | OXYGEN SATURATION: 95 %

## 2022-01-07 DIAGNOSIS — E55.9 VITAMIN D DEFICIENCY, UNSPECIFIED: ICD-10-CM

## 2022-01-07 DIAGNOSIS — Z13.820 ENCOUNTER FOR SCREENING FOR OSTEOPOROSIS: ICD-10-CM

## 2022-01-07 DIAGNOSIS — R92.2 INCONCLUSIVE MAMMOGRAM: ICD-10-CM

## 2022-01-07 DIAGNOSIS — Z00.00 ENCOUNTER FOR GENERAL ADULT MEDICAL EXAMINATION W/OUT ABNORMAL FINDINGS: ICD-10-CM

## 2022-01-07 DIAGNOSIS — R55 SYNCOPE AND COLLAPSE: ICD-10-CM

## 2022-01-07 DIAGNOSIS — N60.99 UNSPECIFIED BENIGN MAMMARY DYSPLASIA OF UNSPECIFIED BREAST: ICD-10-CM

## 2022-01-07 LAB
ALBUMIN: 10
BILIRUB UR QL STRIP: NEGATIVE
CLARITY UR: CLEAR
COLLECTION METHOD: NORMAL
CREATININE: 50
GLUCOSE UR-MCNC: NEGATIVE
HCG UR QL: 0.2 EU/DL
HGB UR QL STRIP.AUTO: NEGATIVE
KETONES UR-MCNC: NEGATIVE
LEUKOCYTE ESTERASE UR QL STRIP: NEGATIVE
MICROALBUMIN/CREAT UR TEST STR-RTO: <30
NITRITE UR QL STRIP: NEGATIVE
PH UR STRIP: 5.5
PROT UR STRIP-MCNC: NEGATIVE
SP GR UR STRIP: 1.01

## 2022-01-07 PROCEDURE — 82044 UR ALBUMIN SEMIQUANTITATIVE: CPT | Mod: QW

## 2022-01-07 PROCEDURE — 99396 PREV VISIT EST AGE 40-64: CPT | Mod: 25

## 2022-01-07 PROCEDURE — 77085 DXA BONE DENSITY AXL VRT FX: CPT

## 2022-01-07 PROCEDURE — 71046 X-RAY EXAM CHEST 2 VIEWS: CPT

## 2022-01-07 PROCEDURE — 93000 ELECTROCARDIOGRAM COMPLETE: CPT

## 2022-01-07 PROCEDURE — 81003 URINALYSIS AUTO W/O SCOPE: CPT | Mod: QW

## 2022-01-07 PROCEDURE — 36415 COLL VENOUS BLD VENIPUNCTURE: CPT

## 2022-01-08 ENCOUNTER — NON-APPOINTMENT (OUTPATIENT)
Age: 57
End: 2022-01-08

## 2022-01-08 PROBLEM — Z00.00 ENCOUNTER FOR PREVENTIVE HEALTH EXAMINATION: Status: ACTIVE | Noted: 2018-11-29

## 2022-01-08 PROBLEM — R55 VASOVAGAL NEAR SYNCOPE: Status: ACTIVE | Noted: 2022-01-07

## 2022-01-08 PROBLEM — E55.9 VITAMIN D INSUFFICIENCY: Status: ACTIVE | Noted: 2018-12-01

## 2022-01-08 LAB
25(OH)D3 SERPL-MCNC: 23.6 NG/ML
BASOPHILS # BLD AUTO: 0.04 K/UL
BASOPHILS NFR BLD AUTO: 0.7 %
CHOLEST SERPL-MCNC: 239 MG/DL
COVID-19 NUCLEOCAPSID  GAM ANTIBODY INTERPRETATION: NEGATIVE
EOSINOPHIL # BLD AUTO: 0.18 K/UL
EOSINOPHIL NFR BLD AUTO: 3.1 %
ESTIMATED AVERAGE GLUCOSE: 111 MG/DL
HBA1C MFR BLD HPLC: 5.5 %
HCT VFR BLD CALC: 41.8 %
HCV AB SER QL: NONREACTIVE
HCV S/CO RATIO: 0.08 S/CO
HDLC SERPL-MCNC: 51 MG/DL
HGB BLD-MCNC: 13 G/DL
IMM GRANULOCYTES NFR BLD AUTO: 0.3 %
LDLC SERPL CALC-MCNC: 168 MG/DL
LYMPHOCYTES # BLD AUTO: 1.72 K/UL
LYMPHOCYTES NFR BLD AUTO: 29.2 %
MAN DIFF?: NORMAL
MCHC RBC-ENTMCNC: 28.6 PG
MCHC RBC-ENTMCNC: 31.1 GM/DL
MCV RBC AUTO: 91.9 FL
MONOCYTES # BLD AUTO: 0.29 K/UL
MONOCYTES NFR BLD AUTO: 4.9 %
NEUTROPHILS # BLD AUTO: 3.64 K/UL
NEUTROPHILS NFR BLD AUTO: 61.8 %
NONHDLC SERPL-MCNC: 188 MG/DL
PLATELET # BLD AUTO: 297 K/UL
RBC # BLD: 4.55 M/UL
RBC # FLD: 12.8 %
SARS-COV-2 AB SERPL QL IA: 0.97 INDEX
T4 SERPL-MCNC: 8.6 UG/DL
TRIGL SERPL-MCNC: 102 MG/DL
TSH SERPL-ACNC: 2.34 UIU/ML
WBC # FLD AUTO: 5.89 K/UL

## 2022-01-10 PROBLEM — Z13.820 SCREENING FOR OSTEOPOROSIS: Status: ACTIVE | Noted: 2022-01-10

## 2022-01-10 LAB
ALBUMIN SERPL ELPH-MCNC: 4.3 G/DL
ALP BLD-CCNC: 113 U/L
ALT SERPL-CCNC: 36 U/L
ANION GAP SERPL CALC-SCNC: 12 MMOL/L
AST SERPL-CCNC: 26 U/L
BILIRUB SERPL-MCNC: 0.2 MG/DL
BUN SERPL-MCNC: 13 MG/DL
CALCIUM SERPL-MCNC: 9.3 MG/DL
CHLORIDE SERPL-SCNC: 105 MMOL/L
CO2 SERPL-SCNC: 26 MMOL/L
CREAT SERPL-MCNC: 0.84 MG/DL
GLUCOSE SERPL-MCNC: 85 MG/DL
POTASSIUM SERPL-SCNC: 4.6 MMOL/L
PROT SERPL-MCNC: 6.8 G/DL
SODIUM SERPL-SCNC: 142 MMOL/L

## 2022-01-10 NOTE — REVIEW OF SYSTEMS
[Fever] : no fever [Fatigue] : no fatigue [Chills] : no chills [Poor Appetite] : no poor appetite [Cough] : no cough [SOB on Exertion] : no sob on exertion

## 2022-01-10 NOTE — PROCEDURE
[FreeTextEntry1] : Chest x-ray PA lateral January 7, 2022\par Post COVID-19 infection\par Cardiac size normal\par Clear lung fields\par No parenchymal infiltrates pleural effusions dominant pulmonary nodules\par Soft tissue bony structures unremarkable\par \par Bone density DEXA scan January 7, 2022\par LP spine L1-L4 normal\par Left femoral neck left hip total normal\par Impression no evidence of osteopenia or osteoporosis\par \par EKG 1/7/22\par NSR\par \par \par Exhaled nitric oxide 18 to be be within normal limits\par PFT July 3, 2019\par Flow rates normal\par Ratio 76\par No response to bronchodilator at FEV1\par Normal lung volumes without air trapping\par Diffusion normal 89% predicted\par Hemoglobin 13.3\par Significant interval improvement of flow rates\par FEV1 improved from 1.89 to 2.75 L\par FVC improving 2.7 63.60 L\par \par Chest x-ray PA lateral projection May 23, 2019\par Cardiac size normal\par Left lower lung zone discoid linear atelectasis\par Clear lungs without evidence for pneumonia\par No parenchymal infiltrates pleural effusions dominant pulmonary nodules\par Data chest x-ray of March 22, 2019 the left lower lung zone discoid atelectasis is new\par \par blood draw\par

## 2022-01-10 NOTE — HISTORY OF PRESENT ILLNESS
[TextBox_4] : Patient mid-December positive COVID-19 infection\par Had some dyspnea on exertion with stair climbing\par COVID-19 PCR PCR was officially + December 9 2020\par She is recovered without any significant symptomatology\par Turns today for a well visit\par No active complaints\par No use of rescue inhaler needed\par Due for mammogram\par Colonoscopy up-to-date\par Other past medical history hyperlipidemia\par No active GERD symptoms\par History of atypical ductal hyperplasia of the breast\par

## 2022-01-10 NOTE — DISCUSSION/SUMMARY
[FreeTextEntry1] : COVID-19 infection December 2020\par Reviewed patient's history\par \par Blood work pending\par Mammogram breast ultrasound ordered\par Check with GI timing for next colonoscopy\par Schedule office PFT\par Medications updated reviewed\par Covid vaccine plus booster is up-to-date\par Did receive flu shot\par \par

## 2022-02-10 ENCOUNTER — RESULT CHARGE (OUTPATIENT)
Age: 57
End: 2022-02-10

## 2022-02-11 ENCOUNTER — APPOINTMENT (OUTPATIENT)
Dept: PULMONOLOGY | Facility: CLINIC | Age: 57
End: 2022-02-11
Payer: COMMERCIAL

## 2022-02-11 VITALS
DIASTOLIC BLOOD PRESSURE: 60 MMHG | OXYGEN SATURATION: 96 % | WEIGHT: 180 LBS | BODY MASS INDEX: 29.95 KG/M2 | HEART RATE: 78 BPM | SYSTOLIC BLOOD PRESSURE: 90 MMHG | RESPIRATION RATE: 14 BRPM

## 2022-02-11 PROCEDURE — 94010 BREATHING CAPACITY TEST: CPT

## 2022-02-11 PROCEDURE — ZZZZZ: CPT

## 2022-02-11 PROCEDURE — 94727 GAS DIL/WSHOT DETER LNG VOL: CPT

## 2022-02-11 PROCEDURE — 95012 NITRIC OXIDE EXP GAS DETER: CPT

## 2022-02-11 PROCEDURE — 94729 DIFFUSING CAPACITY: CPT

## 2022-02-11 PROCEDURE — 99214 OFFICE O/P EST MOD 30 MIN: CPT | Mod: 25

## 2022-02-11 NOTE — DISCUSSION/SUMMARY
[FreeTextEntry1] : COVID  reinfection mid Dec 2021-no with 2 infections did not generate COVID-19 serologic antibody\par COVID-19 infection December 2020\par Well Visit 1/7/22\par HLD\par Reviewed patient's history\par \par Mammogram breast ultrasound ordered we discussed as recommended with GYN evaluation to make sure up to date\par Check with GI timing for next colonoscopy discussed\par Schedule office PFT completed\par Medications updated reviewed\par Covid vaccine plus booster is up-to-date\par Did receive flu shot\par add statin generic  crestor 5 mg with Co Q 10 \par recheck lipids 3 months\par

## 2022-02-11 NOTE — REVIEW OF SYSTEMS
[Negative] : Musculoskeletal [Fever] : no fever [Fatigue] : no fatigue [Chills] : no chills [Poor Appetite] : no poor appetite [Cough] : no cough [SOB on Exertion] : no sob on exertion

## 2022-02-11 NOTE — PROCEDURE
[FreeTextEntry1] : PFT 2/11/22\par Flow  rates nl\par Lung Volumes nl\par DLCO  91 % pred WNL\par HGB 13.0\par NIOX  10  ppb nl range  2/11/22\par \par Chest x-ray PA lateral January 7, 2022\par Post COVID-19 infection\par Cardiac size normal\par Clear lung fields\par No parenchymal infiltrates pleural effusions dominant pulmonary nodules\par Soft tissue bony structures unremarkable\par \par Bone density DEXA scan January 7, 2022\par LP spine L1-L4 normal\par Left femoral neck left hip total normal\par Impression no evidence of osteopenia or osteoporosis\par \par EKG 1/7/22\par NSR\par \par \par Exhaled nitric oxide 18 to be be within normal limits\par PFT July 3, 2019\par Flow rates normal\par Ratio 76\par No response to bronchodilator at FEV1\par Normal lung volumes without air trapping\par Diffusion normal 89% predicted\par Hemoglobin 13.3\par Significant interval improvement of flow rates\par FEV1 improved from 1.89 to 2.75 L\par FVC improving 2.7 63.60 L\par \par Chest x-ray PA lateral projection May 23, 2019\par Cardiac size normal\par Left lower lung zone discoid linear atelectasis\par Clear lungs without evidence for pneumonia\par No parenchymal infiltrates pleural effusions dominant pulmonary nodules\par Data chest x-ray of March 22, 2019 the left lower lung zone discoid atelectasis is new\par \par blood draw\par

## 2022-02-11 NOTE — REASON FOR VISIT
[Follow-Up] : a follow-up visit [TextBox_44] : Well visit\par Post COVID-19 infection\par Mild obstructive airway disease

## 2022-05-11 ENCOUNTER — APPOINTMENT (OUTPATIENT)
Dept: PULMONOLOGY | Facility: CLINIC | Age: 57
End: 2022-05-11
Payer: COMMERCIAL

## 2022-05-11 ENCOUNTER — NON-APPOINTMENT (OUTPATIENT)
Age: 57
End: 2022-05-11

## 2022-05-11 DIAGNOSIS — R07.89 OTHER CHEST PAIN: ICD-10-CM

## 2022-05-11 DIAGNOSIS — E66.9 OBESITY, UNSPECIFIED: ICD-10-CM

## 2022-05-11 DIAGNOSIS — K21.9 GASTRO-ESOPHAGEAL REFLUX DISEASE W/OUT ESOPHAGITIS: ICD-10-CM

## 2022-05-11 PROCEDURE — 99214 OFFICE O/P EST MOD 30 MIN: CPT | Mod: 95

## 2022-05-11 RX ORDER — NIRMATRELVIR AND RITONAVIR 300-100 MG
20 X 150 MG & KIT ORAL
Qty: 1 | Refills: 0 | Status: ACTIVE | COMMUNITY
Start: 2022-05-11 | End: 1900-01-01

## 2022-05-11 NOTE — HISTORY OF PRESENT ILLNESS
[TextBox_4] : COVID  19 infection\par Positive home rapid antigen\par Viral flulike symptoms\par Headache nasal congestion chest congestion\par Fevers\par Positive feeling of coldness and chills\par Positive headache\par No chest pain pleuritic chest pain\par No purulent sputum\par No nausea vomiting diarrhea\par No skin rashes\par History of COVID infection dating back to 2020\par PFIXER X 3 vaccine\par

## 2022-05-11 NOTE — DISCUSSION/SUMMARY
[FreeTextEntry1] : COVID-19 infection May 11, 2022\par Will treat with oral monoclonal antibody\par P axlovid hold statin therapy 24 hours before start of protocol x5 days\par Side effects rebound metallic taste discussed\par Office follow-up\par pepcid 40 mg QD\par From 81 mg x 6 weeks\par Monitor O2 saturations maintain O2 saturation greater than 93% or seek immediate medical attention\par Office follow-up\par \par COVID  reinfection mid Dec 2021-no with 2 infections did not generate COVID-19 serologic antibody\par COVID-19 infection December 2020\par Well Visit 1/7/22\par HLD\par Reviewed patient's history\par \par Mammogram breast ultrasound ordered we discussed as recommended with GYN evaluation to make sure up to date\par Check with GI timing for next colonoscopy discussed\par Schedule office PFT completed\par Medications updated reviewed\par Covid vaccine plus booster is up-to-date\par Did receive flu shot\par add statin generic  crestor 5 mg with Co Q 10 \par recheck lipids 3 months\par

## 2022-05-13 ENCOUNTER — NON-APPOINTMENT (OUTPATIENT)
Age: 57
End: 2022-05-13

## 2022-05-13 ENCOUNTER — APPOINTMENT (OUTPATIENT)
Dept: PULMONOLOGY | Facility: CLINIC | Age: 57
End: 2022-05-13

## 2022-05-13 LAB
ALBUMIN SERPL ELPH-MCNC: 4.3 G/DL
ALP BLD-CCNC: 112 U/L
ALT SERPL-CCNC: 34 U/L
ANION GAP SERPL CALC-SCNC: 13 MMOL/L
AST SERPL-CCNC: 32 U/L
BASOPHILS # BLD AUTO: 0.02 K/UL
BASOPHILS NFR BLD AUTO: 0.3 %
BILIRUB SERPL-MCNC: 0.2 MG/DL
BUN SERPL-MCNC: 11 MG/DL
CALCIUM SERPL-MCNC: 8.9 MG/DL
CHLORIDE SERPL-SCNC: 102 MMOL/L
CO2 SERPL-SCNC: 25 MMOL/L
CREAT SERPL-MCNC: 0.92 MG/DL
CRP SERPL-MCNC: 17 MG/L
DEPRECATED D DIMER PPP IA-ACNC: <150 NG/ML DDU
EGFR: 72 ML/MIN/1.73M2
EOSINOPHIL # BLD AUTO: 0.09 K/UL
EOSINOPHIL NFR BLD AUTO: 1.3 %
FERRITIN SERPL-MCNC: 246 NG/ML
GLUCOSE SERPL-MCNC: 88 MG/DL
HCT VFR BLD CALC: 38.9 %
HGB BLD-MCNC: 13 G/DL
IMM GRANULOCYTES NFR BLD AUTO: 0.3 %
LYMPHOCYTES # BLD AUTO: 1.75 K/UL
LYMPHOCYTES NFR BLD AUTO: 25.5 %
MAN DIFF?: NORMAL
MCHC RBC-ENTMCNC: 29.7 PG
MCHC RBC-ENTMCNC: 33.4 GM/DL
MCV RBC AUTO: 88.8 FL
MONOCYTES # BLD AUTO: 0.66 K/UL
MONOCYTES NFR BLD AUTO: 9.6 %
NEUTROPHILS # BLD AUTO: 4.31 K/UL
NEUTROPHILS NFR BLD AUTO: 63 %
PLATELET # BLD AUTO: 275 K/UL
POTASSIUM SERPL-SCNC: 4.7 MMOL/L
PROCALCITONIN SERPL-MCNC: 0.06 NG/ML
PROT SERPL-MCNC: 6.7 G/DL
RBC # BLD: 4.38 M/UL
RBC # FLD: 13.3 %
SODIUM SERPL-SCNC: 140 MMOL/L
WBC # FLD AUTO: 6.85 K/UL

## 2022-06-03 ENCOUNTER — NON-APPOINTMENT (OUTPATIENT)
Age: 57
End: 2022-06-03

## 2022-06-03 ENCOUNTER — APPOINTMENT (OUTPATIENT)
Dept: PULMONOLOGY | Facility: CLINIC | Age: 57
End: 2022-06-03
Payer: COMMERCIAL

## 2022-06-03 VITALS
DIASTOLIC BLOOD PRESSURE: 75 MMHG | HEART RATE: 79 BPM | TEMPERATURE: 98.6 F | OXYGEN SATURATION: 98 % | SYSTOLIC BLOOD PRESSURE: 107 MMHG

## 2022-06-03 DIAGNOSIS — J40 BRONCHITIS, NOT SPECIFIED AS ACUTE OR CHRONIC: ICD-10-CM

## 2022-06-03 DIAGNOSIS — R05.3 CHRONIC COUGH: ICD-10-CM

## 2022-06-03 LAB
ALBUMIN SERPL ELPH-MCNC: 4.6 G/DL
ALP BLD-CCNC: 92 U/L
ALT SERPL-CCNC: 19 U/L
ANION GAP SERPL CALC-SCNC: 16 MMOL/L
AST SERPL-CCNC: 17 U/L
BASOPHILS # BLD AUTO: 0.04 K/UL
BASOPHILS NFR BLD AUTO: 0.6 %
BILIRUB SERPL-MCNC: 0.2 MG/DL
BUN SERPL-MCNC: 19 MG/DL
CALCIUM SERPL-MCNC: 9.6 MG/DL
CHLORIDE SERPL-SCNC: 102 MMOL/L
CHOLEST SERPL-MCNC: 254 MG/DL
CO2 SERPL-SCNC: 22 MMOL/L
COVID-19 NUCLEOCAPSID  GAM ANTIBODY INTERPRETATION: POSITIVE
CREAT SERPL-MCNC: 0.8 MG/DL
CRP SERPL-MCNC: 3 MG/L
EGFR: 85 ML/MIN/1.73M2
EOSINOPHIL # BLD AUTO: 0.21 K/UL
EOSINOPHIL NFR BLD AUTO: 3.2 %
GLUCOSE SERPL-MCNC: 105 MG/DL
HCT VFR BLD CALC: 42.3 %
HDLC SERPL-MCNC: 57 MG/DL
HGB BLD-MCNC: 13.2 G/DL
IMM GRANULOCYTES NFR BLD AUTO: 0.3 %
LDLC SERPL CALC-MCNC: 170 MG/DL
LYMPHOCYTES # BLD AUTO: 1.95 K/UL
LYMPHOCYTES NFR BLD AUTO: 30 %
MAN DIFF?: NORMAL
MCHC RBC-ENTMCNC: 28.8 PG
MCHC RBC-ENTMCNC: 31.2 GM/DL
MCV RBC AUTO: 92.4 FL
MONOCYTES # BLD AUTO: 0.34 K/UL
MONOCYTES NFR BLD AUTO: 5.2 %
NEUTROPHILS # BLD AUTO: 3.93 K/UL
NEUTROPHILS NFR BLD AUTO: 60.7 %
NONHDLC SERPL-MCNC: 198 MG/DL
PLATELET # BLD AUTO: 291 K/UL
POTASSIUM SERPL-SCNC: 5.1 MMOL/L
PROT SERPL-MCNC: 7.1 G/DL
RBC # BLD: 4.58 M/UL
RBC # FLD: 13.2 %
SARS-COV-2 AB SERPL QL IA: 148 INDEX
SODIUM SERPL-SCNC: 140 MMOL/L
TRIGL SERPL-MCNC: 140 MG/DL
WBC # FLD AUTO: 6.49 K/UL

## 2022-06-03 PROCEDURE — 94010 BREATHING CAPACITY TEST: CPT

## 2022-06-03 PROCEDURE — 36415 COLL VENOUS BLD VENIPUNCTURE: CPT

## 2022-06-03 PROCEDURE — 99214 OFFICE O/P EST MOD 30 MIN: CPT | Mod: 25

## 2022-06-03 PROCEDURE — 71046 X-RAY EXAM CHEST 2 VIEWS: CPT

## 2022-06-03 PROCEDURE — 95012 NITRIC OXIDE EXP GAS DETER: CPT

## 2022-06-03 NOTE — REVIEW OF SYSTEMS
[Cough] : cough [Negative] : Psychiatric [Fever] : no fever [Fatigue] : no fatigue [Chills] : no chills [Nasal Congestion] : no nasal congestion [TextBox_44] : Hyperlipidemia on statin

## 2022-06-03 NOTE — PROCEDURE
[FreeTextEntry1] : Chest x-ray PA lateral Lila 3, 2022 post COVID-19 infection\par Third COVID infection noted\par Lung fields remain clear without parenchymal infiltrates pleural effusions with dominant pulmonary nodules\par Soft tissue bony structures unremarkable\par No interval change compared to chest x-ray chronic changes in technique dating back to January 7, 2022\par \par [place] No BD 6/3/22\par flow rates nl\par NIOX 19 ppb WNL 6/3/22\par \par PFT 2/11/22\par Flow  rates nl\par Lung Volumes nl\par DLCO  91 % pred WNL\par HGB 13.0\par NIOX  10  ppb nl range  2/11/22\par \par Chest x-ray PA lateral January 7, 2022\par Post COVID-19 infection\par Cardiac size normal\par Clear lung fields\par No parenchymal infiltrates pleural effusions dominant pulmonary nodules\par Soft tissue bony structures unremarkable\par \par Bone density DEXA scan January 7, 2022\par LP spine L1-L4 normal\par Left femoral neck left hip total normal\par Impression no evidence of osteopenia or osteoporosis\par \par EKG 1/7/22\par NSR\par \par \par Exhaled nitric oxide 18 to be be within normal limits\par PFT July 3, 2019\par Flow rates normal\par Ratio 76\par No response to bronchodilator at FEV1\par Normal lung volumes without air trapping\par Diffusion normal 89% predicted\par Hemoglobin 13.3\par Significant interval improvement of flow rates\par FEV1 improved from 1.89 to 2.75 L\par FVC improving 2.7 63.60 L\par \par Chest x-ray PA lateral projection May 23, 2019\par Cardiac size normal\par Left lower lung zone discoid linear atelectasis\par Clear lungs without evidence for pneumonia\par No parenchymal infiltrates pleural effusions dominant pulmonary nodules\par Data chest x-ray of March 22, 2019 the left lower lung zone discoid atelectasis is new\par \par blood draw\par

## 2022-06-03 NOTE — HISTORY OF PRESENT ILLNESS
[TextBox_4] : Post COVID cough\par Review of prior visit May 11, 2022 status post COVID-19 infection with a positive rapid home\par Symptoms at the time included flulike symptoms\par Patient is a prior history of a COVID-19 infection also dating back to 2020\par Has received Pfizer COVID-vaccine x3\par Former smoker\par Treatment recommendations based on the COVID-19 infection May 11, 2022\par Oral monoclonal antibody therapy pack Slo-Bid with instructions to hold statin completed on\par Completed 40 mg Pepcid\par Aspirin 81 x 6 weeks\par O2 saturations remained stable\par Should be noted patient had a prior mid December 2020 infection as well as the above-noted December 2020 but yet did not demonstrate COVID-19 serologic antibodies with documented COVID PCR \par \par Past medical history hyperlipidemia overweight\par History of bronchitis\par History atypical ductal hyperplasia breast\par GERD\par \par

## 2022-06-03 NOTE — DISCUSSION/SUMMARY
[FreeTextEntry1] : COVID infection May 12, 2022\par post viral bronchitis/ post COVID small airways disease\par COVID  reinfection mid Dec 2021-no with 2 infections did not generate COVID-19 serologic antibody\par COVID-19 infection December 2020\par Well Visit 1/7/22\par HLD\par Reviewed patient's history\par \par Mammogram breast ultrasound ordered we discussed as recommended with GYN evaluation to make sure up to date\par Check with GI timing for next colonoscopy discussed\par Medications updated reviewed\par Covid vaccine plus booster is up-to-date\par Next flu shot fall 2022\par add statin generic  crestor 5 mg with Co Q 10 \par recheck lipids 3 months\par consider ICS if no cough resolution post COVID\par

## 2022-09-02 ENCOUNTER — NON-APPOINTMENT (OUTPATIENT)
Age: 57
End: 2022-09-02

## 2022-09-02 ENCOUNTER — APPOINTMENT (OUTPATIENT)
Dept: PULMONOLOGY | Facility: CLINIC | Age: 57
End: 2022-09-02

## 2022-09-02 VITALS
HEIGHT: 66 IN | BODY MASS INDEX: 28.93 KG/M2 | SYSTOLIC BLOOD PRESSURE: 102 MMHG | WEIGHT: 180 LBS | TEMPERATURE: 97.6 F | HEART RATE: 83 BPM | DIASTOLIC BLOOD PRESSURE: 69 MMHG | OXYGEN SATURATION: 94 %

## 2022-09-02 DIAGNOSIS — E78.5 HYPERLIPIDEMIA, UNSPECIFIED: ICD-10-CM

## 2022-09-02 DIAGNOSIS — U07.1 COVID-19: ICD-10-CM

## 2022-09-02 DIAGNOSIS — G47.9 SLEEP DISORDER, UNSPECIFIED: ICD-10-CM

## 2022-09-02 LAB
CHOLEST SERPL-MCNC: 231 MG/DL
HDLC SERPL-MCNC: 50 MG/DL
LDLC SERPL CALC-MCNC: 157 MG/DL
NONHDLC SERPL-MCNC: 182 MG/DL
TRIGL SERPL-MCNC: 126 MG/DL

## 2022-09-02 PROCEDURE — 99214 OFFICE O/P EST MOD 30 MIN: CPT | Mod: 25

## 2022-09-02 PROCEDURE — 36415 COLL VENOUS BLD VENIPUNCTURE: CPT

## 2022-09-02 RX ORDER — ATORVASTATIN CALCIUM 10 MG/1
10 TABLET, FILM COATED ORAL
Qty: 30 | Refills: 5 | Status: ACTIVE | COMMUNITY
Start: 2022-09-02 | End: 1900-01-01

## 2022-09-02 RX ORDER — FAMOTIDINE 40 MG/1
40 TABLET, FILM COATED ORAL
Qty: 30 | Refills: 0 | Status: DISCONTINUED | COMMUNITY
Start: 2022-05-11 | End: 2022-09-02

## 2022-09-02 RX ORDER — ROSUVASTATIN CALCIUM 10 MG/1
10 TABLET, FILM COATED ORAL
Qty: 90 | Refills: 3 | Status: DISCONTINUED | COMMUNITY
Start: 2022-02-11 | End: 2022-09-02

## 2022-09-02 NOTE — PHYSICAL EXAM
[No Acute Distress] : no acute distress [Well Developed] : well developed [IV] : Mallampati Class: IV [Normal Appearance] : normal appearance [Supple] : supple [No Neck Mass] : no neck mass [No JVD] : no jvd [Normal Rate/Rhythm] : normal rate/rhythm [Normal S1, S2] : normal s1, s2 [No Murmurs] : no murmurs [No Rubs] : no rubs [No Gallops] : no gallops [No Resp Distress] : no resp distress [No Acc Muscle Use] : no acc muscle use [Normal Palpation] : normal palpation [Normal Rhythm and Effort] : normal rhythm and effort [Clear to Auscultation Bilaterally] : clear to auscultation bilaterally [Normal to Percussion] : normal to percussion [No Abnormalities] : no abnormalities [Benign] : benign [Normal Gait] : normal gait [No Clubbing] : no clubbing [No Cyanosis] : no cyanosis [No Edema] : no edema [FROM] : FROM [Normal Color/ Pigmentation] : normal color/ pigmentation [No Rash] : no rash [No Focal Deficits] : no focal deficits [Oriented x3] : oriented x3 [Normal Mood] : normal mood [Normal Affect] : normal affect

## 2022-09-02 NOTE — PROCEDURE
[FreeTextEntry1] : Chest x-ray PA lateral Lila 3, 2022 post COVID-19 infection\par Third COVID infection noted\par Lung fields remain clear without parenchymal infiltrates pleural effusions with dominant pulmonary nodules\par Soft tissue bony structures unremarkable\par No interval change compared to chest x-ray chronic changes in technique dating back to January 7, 2022\par \par [place] No BD 6/3/22\par flow rates nl\par NIOX 19 ppb WNL 6/3/22\par \par PFT 2/11/22\par Flow  rates nl\par Lung Volumes nl\par DLCO  91 % pred WNL\par HGB 13.0\par NIOX  10  ppb nl range  2/11/22\par \par Chest x-ray PA lateral January 7, 2022\par Post COVID-19 infection\par Cardiac size normal\par Clear lung fields\par No parenchymal infiltrates pleural effusions dominant pulmonary nodules\par Soft tissue bony structures unremarkable\par \par Bone density DEXA scan January 7, 2022\par LP spine L1-L4 normal\par Left femoral neck left hip total normal\par Impression no evidence of osteopenia or osteoporosis\par \par EKG 1/7/22\par NSR\par \par Exhaled nitric oxide 18 to be be within normal limits\par PFT July 3, 2019\par Flow rates normal\par Ratio 76\par No response to bronchodilator at FEV1\par Normal lung volumes without air trapping\par Diffusion normal 89% predicted\par Hemoglobin 13.3\par Significant interval improvement of flow rates\par FEV1 improved from 1.89 to 2.75 L\par FVC improving 2.7 63.60 L\par \par Chest x-ray PA lateral projection May 23, 2019\par Cardiac size normal\par Left lower lung zone discoid linear atelectasis\par Clear lungs without evidence for pneumonia\par No parenchymal infiltrates pleural effusions dominant pulmonary nodules\par Data chest x-ray of March 22, 2019 the left lower lung zone discoid atelectasis is new\par \par blood draw\par

## 2022-09-02 NOTE — DISCUSSION/SUMMARY
[FreeTextEntry1] : COVID infection May 12, 2022\par post viral bronchitis/ post COVID small airways disease\par COVID  reinfection mid Dec 2021-no with 2 infections did not generate COVID-19 serologic antibody\par COVID-19 infection December 2020\par Well Visit 1/7/22 f/u jan 2023\par HLD chagne  crestor to atorvastatin and see if better tolerated  other option Zetia \par sllep disorder -HSS\par Reviewed patient's history\par \par Mammogram breast ultrasound ordered we discussed as recommended with GYN evaluation to make sure up to date\par Check with GI timing for next colonoscopy discussed\par Medications updated reviewed\par Covid vaccine plus booster is up-to-date\par Next flu shot fall 2022 declined\par add statin generic  crestor 5 mg with Co Q 10 \par recheck lipids 3 months\par consider ICS if no cough resolution post COVID\par

## 2023-07-17 ENCOUNTER — APPOINTMENT (OUTPATIENT)
Dept: ORTHOPEDIC SURGERY | Facility: CLINIC | Age: 58
End: 2023-07-17
Payer: COMMERCIAL

## 2023-07-17 VITALS — BODY MASS INDEX: 28.93 KG/M2 | WEIGHT: 180 LBS | HEIGHT: 66 IN

## 2023-07-17 DIAGNOSIS — S63.501A UNSPECIFIED SPRAIN OF RIGHT WRIST, INITIAL ENCOUNTER: ICD-10-CM

## 2023-07-17 DIAGNOSIS — S62.101A FRACTURE OF UNSPECIFIED CARPAL BONE, RIGHT WRIST, INITIAL ENCOUNTER FOR CLOSED FRACTURE: ICD-10-CM

## 2023-07-17 PROCEDURE — 99204 OFFICE O/P NEW MOD 45 MIN: CPT

## 2023-07-17 PROCEDURE — 73130 X-RAY EXAM OF HAND: CPT | Mod: RT

## 2023-07-17 NOTE — ASSESSMENT
[FreeTextEntry1] : The condition was explained to the patient.\par \par - continue wrist brace, full time except hygiene x 1 week, then for lifting activity and sleep x 2 weeks, then for sleep only.\par - light activity. no lifting/pushing/pulling >5lbs. \par \par F/u 2 weeks.

## 2023-07-17 NOTE — IMAGING
[de-identified] : RIGHT HAND\par skin intact. mild swelling of wrist.\par TTP to dorsal ulnar carpus > distal radius. non-tender to snuffbox, elbow.\par good EPL, FPL. good finger extension, flex to full fist. good finger abduction and adduction. \par numbness of thumb, IF. SILT to other digits and 1st dorsal webspace.\par brisk cap refill all digits.\par no triggering.\par \par \par XRAYS OF RIGHT HAND/WRIST: avulsion fx dorsal to carpus, likely off triquetrum. djd of thumb IPJ.

## 2023-07-17 NOTE — HISTORY OF PRESENT ILLNESS
[6] : 6 [0] : 0 [Sharp] : sharp [Leisure] : leisure [Nothing helps with pain getting better] : Nothing helps with pain getting better [de-identified] : 7/17/23: 56yo LHD female () presents for RIGHT wrist pain after FOOSH on vacation on 6/27/23.\par Reports that she went to ER => XR, splint.\par Reports that she saw a local orthopedist while on vacation => changed to wrist brace.\par Reports that pain is improving but continues to have constant numbness of thumb and index finger since the injury. Numbness has been stable. Reports that she has had dorsal radial hand/wrist pain for years, was diagnosed with CTS, for which she saw a chiropracter.\par \par Hx: IBS. GERD. HLD. insomnia.\par Sx: cholecystectomy. [] : no [FreeTextEntry1] : Rt Wrist [FreeTextEntry3] : 06/27/2023 [FreeTextEntry5] : Pt here for Rt wrist Injury. Pt was on vacation playing pickle ball and landed or rt wrist and rt shoulder. Pt treated at [facility] in [place] and had XR and Dx fx in the Rt Wrist. Pt using brace on Rt wrist for support. Rt Index finger and Rt Thumb numbing sensation. ROM limited.  [FreeTextEntry7] : forearm  [FreeTextEntry9] : brace [de-identified] : XR

## 2023-08-03 ENCOUNTER — APPOINTMENT (OUTPATIENT)
Dept: ORTHOPEDIC SURGERY | Facility: CLINIC | Age: 58
End: 2023-08-03
Payer: COMMERCIAL

## 2023-08-03 DIAGNOSIS — G56.02 CARPAL TUNNEL SYNDROME, LEFT UPPER LIMB: ICD-10-CM

## 2023-08-03 DIAGNOSIS — G56.01 CARPAL TUNNEL SYNDROME, RIGHT UPPER LIMB: ICD-10-CM

## 2023-08-03 PROCEDURE — 99213 OFFICE O/P EST LOW 20 MIN: CPT

## 2023-08-03 NOTE — HISTORY OF PRESENT ILLNESS
[6] : 6 [0] : 0 [Sharp] : sharp [Leisure] : leisure [Nothing helps with pain getting better] : Nothing helps with pain getting better [de-identified] : 8/3/23: - 5 weeks s/p RIGHT wrist sprain with dorsal avulsion fx. pain better.  c/o dorsal forearm pain. stopped wearing wrist brace. - f/u RIGHT CTS. continues to have constant numbness of thumb and index finger. not wearing wrist brace for sleep because it feels more numb afterwards. Also admits intermittent numbness of LEFT hand.  7/17/23: 58yo LHD female () presents for RIGHT wrist pain after FOOSH on vacation on 6/27/23. Reports that she went to ER => XR, splint. Reports that she saw a local orthopedist while on vacation => changed to wrist brace. Reports that pain is improving but continues to have constant numbness of thumb and index finger since the injury. Numbness has been stable. Reports that she has had dorsal radial hand/wrist pain for years, was diagnosed with CTS, for which she saw a chiropracter.  Hx: IBS. GERD. HLD. insomnia. Sx: cholecystectomy. [] : no [FreeTextEntry1] : Rt Wrist [FreeTextEntry3] : 06/27/2023 [FreeTextEntry5] : Pt here for Rt wrist Injury. not wearing brace anymore. wrist is doing better able to pick items up.  [FreeTextEntry7] : forearm  [FreeTextEntry9] : brace [de-identified] : XR

## 2023-08-03 NOTE — IMAGING
[de-identified] : RIGHT HAND skin intact. minimal swelling of wrist. mild TTP throughout dorsal forearm. mild TTP to dorsal ulnar carpus. wrist ROM: mild limited extension, flexion. + pain @ end wrist flexion. good EPL, FPL. good finger extension, flex to full fist. good finger abduction and adduction.  numbness of thumb, IF. SILT to other digits and 1st dorsal webspace. palpable radial pulse, brisk cap refill all digits.  5/5. 1st DI 5/5. APB 4/5. no triggering. + Tinel's at carpal tunnel.   LEFT HAND skin intact. no swelling. no TTP. wrist ROM: good extension, flexion. good EPL, FPL. good finger extension, flex to full fist. good finger abduction and adduction.  SILT to median, ulnar, radial distributions. palpable radial pulse, brisk cap refill all digits.  5/5, 1st DI 5/5, APB 4/5. no triggering. negative Tinel's at carpal tunnel.

## 2023-08-10 ENCOUNTER — APPOINTMENT (OUTPATIENT)
Dept: NEUROLOGY | Facility: CLINIC | Age: 58
End: 2023-08-10
Payer: COMMERCIAL

## 2023-08-10 PROCEDURE — 95911 NRV CNDJ TEST 9-10 STUDIES: CPT

## 2023-08-10 PROCEDURE — 95886 MUSC TEST DONE W/N TEST COMP: CPT | Mod: 26

## 2023-08-21 ENCOUNTER — APPOINTMENT (OUTPATIENT)
Dept: ORTHOPEDIC SURGERY | Facility: CLINIC | Age: 58
End: 2023-08-21

## 2023-08-29 ENCOUNTER — NON-APPOINTMENT (OUTPATIENT)
Age: 58
End: 2023-08-29

## 2024-07-25 ENCOUNTER — LABORATORY RESULT (OUTPATIENT)
Age: 59
End: 2024-07-25

## 2024-07-25 ENCOUNTER — APPOINTMENT (OUTPATIENT)
Dept: PULMONOLOGY | Facility: CLINIC | Age: 59
End: 2024-07-25
Payer: COMMERCIAL

## 2024-07-25 VITALS — OXYGEN SATURATION: 95 % | DIASTOLIC BLOOD PRESSURE: 81 MMHG | SYSTOLIC BLOOD PRESSURE: 117 MMHG | HEART RATE: 73 BPM

## 2024-07-25 DIAGNOSIS — Z00.00 ENCOUNTER FOR GENERAL ADULT MEDICAL EXAMINATION W/OUT ABNORMAL FINDINGS: ICD-10-CM

## 2024-07-25 DIAGNOSIS — E55.9 VITAMIN D DEFICIENCY, UNSPECIFIED: ICD-10-CM

## 2024-07-25 DIAGNOSIS — N60.99 UNSPECIFIED BENIGN MAMMARY DYSPLASIA OF UNSPECIFIED BREAST: ICD-10-CM

## 2024-07-25 DIAGNOSIS — E78.5 HYPERLIPIDEMIA, UNSPECIFIED: ICD-10-CM

## 2024-07-25 DIAGNOSIS — E66.9 OBESITY, UNSPECIFIED: ICD-10-CM

## 2024-07-25 DIAGNOSIS — Z13.820 ENCOUNTER FOR SCREENING FOR OSTEOPOROSIS: ICD-10-CM

## 2024-07-25 LAB
ALBUMIN: 30
BILIRUB UR QL STRIP: NORMAL
CLARITY UR: CLEAR
COLLECTION METHOD: NORMAL
CREATININE: 300
GLUCOSE UR-MCNC: NORMAL
HCG UR QL: 0.2 EU/DL
HGB UR QL STRIP.AUTO: NORMAL
KETONES UR-MCNC: NORMAL
LEUKOCYTE ESTERASE UR QL STRIP: NORMAL
MICROALBUMIN/CREAT UR TEST STR-RTO: 30
NITRITE UR QL STRIP: NORMAL
PH UR STRIP: 5.5
PROT UR STRIP-MCNC: NORMAL
SP GR UR STRIP: 1.03

## 2024-07-25 PROCEDURE — 93000 ELECTROCARDIOGRAM COMPLETE: CPT

## 2024-07-25 PROCEDURE — 99396 PREV VISIT EST AGE 40-64: CPT

## 2024-07-25 PROCEDURE — 81003 URINALYSIS AUTO W/O SCOPE: CPT | Mod: QW

## 2024-07-25 PROCEDURE — 71046 X-RAY EXAM CHEST 2 VIEWS: CPT

## 2024-07-25 PROCEDURE — 82044 UR ALBUMIN SEMIQUANTITATIVE: CPT | Mod: QW

## 2024-07-25 PROCEDURE — 77085 DXA BONE DENSITY AXL VRT FX: CPT

## 2024-07-25 PROCEDURE — 94010 BREATHING CAPACITY TEST: CPT

## 2024-07-25 PROCEDURE — 36415 COLL VENOUS BLD VENIPUNCTURE: CPT

## 2024-07-25 NOTE — HISTORY OF PRESENT ILLNESS
[Never] : never [TextBox_4] : Overweight  Post COVID cough Review of prior visit May 11, 2022 status post COVID-19 infection with a positive rapid home Symptoms at the time included flulike symptoms Patient is a prior history of a COVID-19 infection also dating back to 2020 Has received Pfizer COVID-vaccine x3 Former smoker Treatment recommendations based on the COVID-19 infection May 11, 2022 Oral monoclonal antibody therapy pack Slo-Bid with instructions to hold statin completed on Completed 40 mg Pepcid Aspirin 81 x 6 weeks O2 saturations remained stable Should be noted patient had a prior mid December 2020 infection as well as the above-noted December 2020 but yet did not demonstrate COVID-19 serologic antibodies with documented COVID PCR   Past medical history hyperlipidemia overweight History of bronchitis History atypical ductal hyperplasia breast GERD

## 2024-07-25 NOTE — PROCEDURE
[FreeTextEntry1] : Chest x-ray PA lateral July 25, 2024 Cardiac size is normal Lung fields are clear No parenchymal infiltrates pleural effusion or dominant pulmonary nodules Soft tissue bony structures unremarkable Jackie mediastinum unremarkable No interval change compared to prior chest x-rays last dating back to Lila 3, 2022 Overall impression clear lungs  Flow-volume loop spirometry no bronchodilator July 25, 2024 Flow rates are normal range  EKG July 25, 2024 Normal sinus rhythm Low voltage left atrial enlargement  DEXA bone density July 25, 2024 AP spine L1-4 normal Left femoral neck normal Total left hip normal Follow-up 2-5 years   Chest x-ray PA lateral Lila 3, 2022 post COVID-19 infection Third COVID infection noted Lung fields remain clear without parenchymal infiltrates pleural effusions with dominant pulmonary nodules Soft tissue bony structures unremarkable No interval change compared to chest x-ray chronic changes in technique dating back to January 7, 2022  [place] No BD 6/3/22 flow rates nl NIOX 19 ppb WNL 6/3/22  PFT 2/11/22 Flow  rates nl Lung Volumes nl DLCO  91 % pred WNL HGB 13.0 NIOX  10  ppb nl range  2/11/22  Chest x-ray PA lateral January 7, 2022 Post COVID-19 infection Cardiac size normal Clear lung fields No parenchymal infiltrates pleural effusions dominant pulmonary nodules Soft tissue bony structures unremarkable  Bone density DEXA scan January 7, 2022 LP spine L1-L4 normal Left femoral neck left hip total normal Impression no evidence of osteopenia or osteoporosis  EKG 1/7/22 NSR  Exhaled nitric oxide 18 to be be within normal limits PFT July 3, 2019 Flow rates normal Ratio 76 No response to bronchodilator at FEV1 Normal lung volumes without air trapping Diffusion normal 89% predicted Hemoglobin 13.3 Significant interval improvement of flow rates FEV1 improved from 1.89 to 2.75 L FVC improving 2.7 63.60 L  Chest x-ray PA lateral projection May 23, 2019 Cardiac size normal Left lower lung zone discoid linear atelectasis Clear lungs without evidence for pneumonia No parenchymal infiltrates pleural effusions dominant pulmonary nodules Data chest x-ray of March 22, 2019 the left lower lung zone discoid atelectasis is new  blood draw

## 2024-07-25 NOTE — DISCUSSION/SUMMARY
[FreeTextEntry1] : Overweight Well visit completed July 25, 2024 Blood work pending Patient would like to entertain weight loss program Referred by [facility] for baseline cardiology and weight loss management  COVID infection May 12, 2022 post viral bronchitis/ post COVID small airways disease COVID  reinfection mid Dec 2021-no with 2 infections did not generate COVID-19 serologic antibody COVID-19 infection December 2020 Well Visit 1/7/22 f/u jan 2023 HLD chagne  crestor to atorvastatin and see if better tolerated  other option Zetia  sllep disorder -HSS Reviewed patient's history  Mammogram breast ultrasound ordered we discussed as recommended with GYN evaluation to make sure up to date Check with GI timing for next colonoscopy discussed Medications updated reviewed Covid vaccine plus booster is up-to-date Next flu shot fall 2022 declined add statin generic  crestor 5 mg with Co Q 10  recheck lipids 3 months consider ICS if no cough resolution post COVID

## 2024-07-26 ENCOUNTER — NON-APPOINTMENT (OUTPATIENT)
Age: 59
End: 2024-07-26

## 2024-07-26 ENCOUNTER — APPOINTMENT (OUTPATIENT)
Dept: CT IMAGING | Facility: CLINIC | Age: 59
End: 2024-07-26
Payer: COMMERCIAL

## 2024-07-26 DIAGNOSIS — R73.03 PREDIABETES.: ICD-10-CM

## 2024-07-26 LAB
25(OH)D3 SERPL-MCNC: 23.4 NG/ML
ALBUMIN SERPL ELPH-MCNC: 4.6 G/DL
ALP BLD-CCNC: 92 U/L
ALT SERPL-CCNC: 19 U/L
ANION GAP SERPL CALC-SCNC: 14 MMOL/L
AST SERPL-CCNC: 13 U/L
BASOPHILS # BLD AUTO: 0.03 K/UL
BASOPHILS NFR BLD AUTO: 0.4 %
BILIRUB DIRECT SERPL-MCNC: 0.1 MG/DL
BILIRUB INDIRECT SERPL-MCNC: 0.2 MG/DL
BILIRUB SERPL-MCNC: 0.3 MG/DL
BUN SERPL-MCNC: 14 MG/DL
CALCIUM SERPL-MCNC: 9.7 MG/DL
CHLORIDE SERPL-SCNC: 104 MMOL/L
CHOLEST SERPL-MCNC: 258 MG/DL
CO2 SERPL-SCNC: 23 MMOL/L
CREAT SERPL-MCNC: 0.77 MG/DL
EGFR: 88 ML/MIN/1.73M2
EOSINOPHIL # BLD AUTO: 0.25 K/UL
EOSINOPHIL NFR BLD AUTO: 3.3 %
ESTIMATED AVERAGE GLUCOSE: 117 MG/DL
GLUCOSE SERPL-MCNC: 96 MG/DL
HBA1C MFR BLD HPLC: 5.7 %
HCT VFR BLD CALC: 44.9 %
HDLC SERPL-MCNC: 51 MG/DL
HGB BLD-MCNC: 13.5 G/DL
IMM GRANULOCYTES NFR BLD AUTO: 0.3 %
LDLC SERPL CALC-MCNC: 181 MG/DL
LYMPHOCYTES # BLD AUTO: 2.56 K/UL
LYMPHOCYTES NFR BLD AUTO: 34.1 %
MAN DIFF?: NORMAL
MCHC RBC-ENTMCNC: 28 PG
MCHC RBC-ENTMCNC: 30.1 GM/DL
MCV RBC AUTO: 93.2 FL
MONOCYTES # BLD AUTO: 0.45 K/UL
MONOCYTES NFR BLD AUTO: 6 %
NEUTROPHILS # BLD AUTO: 4.2 K/UL
NEUTROPHILS NFR BLD AUTO: 55.9 %
NONHDLC SERPL-MCNC: 207 MG/DL
PLATELET # BLD AUTO: 308 K/UL
POTASSIUM SERPL-SCNC: 4.6 MMOL/L
PROT SERPL-MCNC: 7.2 G/DL
RBC # BLD: 4.82 M/UL
RBC # FLD: 13.2 %
SODIUM SERPL-SCNC: 142 MMOL/L
T3 SERPL-MCNC: 142 NG/DL
T3RU NFR SERPL: 1.1 TBI
T4 FREE SERPL-MCNC: 1.1 NG/DL
T4 SERPL-MCNC: 9 UG/DL
TRIGL SERPL-MCNC: 143 MG/DL
TSH SERPL-ACNC: 2.68 UIU/ML
WBC # FLD AUTO: 7.51 K/UL

## 2024-07-26 PROCEDURE — 75571 CT HRT W/O DYE W/CA TEST: CPT | Mod: 26

## 2024-07-29 ENCOUNTER — NON-APPOINTMENT (OUTPATIENT)
Age: 59
End: 2024-07-29

## 2024-07-31 ENCOUNTER — APPOINTMENT (OUTPATIENT)
Dept: ULTRASOUND IMAGING | Facility: IMAGING CENTER | Age: 59
End: 2024-07-31

## 2024-07-31 ENCOUNTER — APPOINTMENT (OUTPATIENT)
Dept: MAMMOGRAPHY | Facility: IMAGING CENTER | Age: 59
End: 2024-07-31

## 2024-12-20 ENCOUNTER — APPOINTMENT (OUTPATIENT)
Dept: PULMONOLOGY | Facility: CLINIC | Age: 59
End: 2024-12-20
Payer: COMMERCIAL

## 2024-12-20 VITALS — SYSTOLIC BLOOD PRESSURE: 119 MMHG | HEART RATE: 94 BPM | OXYGEN SATURATION: 98 % | DIASTOLIC BLOOD PRESSURE: 79 MMHG

## 2024-12-20 DIAGNOSIS — J40 BRONCHITIS, NOT SPECIFIED AS ACUTE OR CHRONIC: ICD-10-CM

## 2024-12-20 DIAGNOSIS — K21.9 GASTRO-ESOPHAGEAL REFLUX DISEASE W/OUT ESOPHAGITIS: ICD-10-CM

## 2024-12-20 DIAGNOSIS — R05.3 CHRONIC COUGH: ICD-10-CM

## 2024-12-20 PROCEDURE — 95012 NITRIC OXIDE EXP GAS DETER: CPT

## 2024-12-20 PROCEDURE — 99214 OFFICE O/P EST MOD 30 MIN: CPT | Mod: 25

## 2024-12-20 RX ORDER — FAMOTIDINE 40 MG/1
40 TABLET, FILM COATED ORAL
Qty: 90 | Refills: 1 | Status: ACTIVE | COMMUNITY
Start: 2024-12-20 | End: 1900-01-01

## 2024-12-20 RX ORDER — ALBUTEROL SULFATE AND BUDESONIDE 90; 80 UG/1; UG/1
90-80 AEROSOL, METERED RESPIRATORY (INHALATION) EVERY 6 HOURS
Qty: 1 | Refills: 3 | Status: ACTIVE | COMMUNITY
Start: 2024-12-20 | End: 1900-01-01

## 2025-01-10 ENCOUNTER — APPOINTMENT (OUTPATIENT)
Dept: PULMONOLOGY | Facility: CLINIC | Age: 60
End: 2025-01-10

## 2025-07-15 ENCOUNTER — APPOINTMENT (OUTPATIENT)
Dept: CT IMAGING | Facility: CLINIC | Age: 60
End: 2025-07-15

## 2025-07-15 ENCOUNTER — OUTPATIENT (OUTPATIENT)
Dept: OUTPATIENT SERVICES | Facility: HOSPITAL | Age: 60
LOS: 1 days | End: 2025-07-15
Payer: COMMERCIAL

## 2025-07-15 DIAGNOSIS — R91.1 SOLITARY PULMONARY NODULE: ICD-10-CM

## 2025-07-15 DIAGNOSIS — Z00.8 ENCOUNTER FOR OTHER GENERAL EXAMINATION: ICD-10-CM

## 2025-07-15 PROCEDURE — 71250 CT THORAX DX C-: CPT

## 2025-07-15 PROCEDURE — 71250 CT THORAX DX C-: CPT | Mod: 26

## 2025-07-23 ENCOUNTER — NON-APPOINTMENT (OUTPATIENT)
Age: 60
End: 2025-07-23

## 2025-07-23 ENCOUNTER — RX RENEWAL (OUTPATIENT)
Age: 60
End: 2025-07-23

## 2025-07-30 ENCOUNTER — NON-APPOINTMENT (OUTPATIENT)
Age: 60
End: 2025-07-30

## 2025-08-01 ENCOUNTER — LABORATORY RESULT (OUTPATIENT)
Age: 60
End: 2025-08-01

## 2025-08-01 ENCOUNTER — APPOINTMENT (OUTPATIENT)
Dept: PULMONOLOGY | Facility: CLINIC | Age: 60
End: 2025-08-01
Payer: COMMERCIAL

## 2025-08-01 ENCOUNTER — NON-APPOINTMENT (OUTPATIENT)
Age: 60
End: 2025-08-01

## 2025-08-01 VITALS
WEIGHT: 180 LBS | HEART RATE: 80 BPM | RESPIRATION RATE: 16 BRPM | OXYGEN SATURATION: 97 % | HEIGHT: 66 IN | DIASTOLIC BLOOD PRESSURE: 68 MMHG | SYSTOLIC BLOOD PRESSURE: 101 MMHG | BODY MASS INDEX: 28.93 KG/M2

## 2025-08-01 DIAGNOSIS — R91.1 SOLITARY PULMONARY NODULE: ICD-10-CM

## 2025-08-01 DIAGNOSIS — Z00.00 ENCOUNTER FOR GENERAL ADULT MEDICAL EXAMINATION W/OUT ABNORMAL FINDINGS: ICD-10-CM

## 2025-08-01 DIAGNOSIS — E55.9 VITAMIN D DEFICIENCY, UNSPECIFIED: ICD-10-CM

## 2025-08-01 DIAGNOSIS — R73.03 PREDIABETES.: ICD-10-CM

## 2025-08-01 LAB
ALBUMIN: 30
BILIRUB UR QL STRIP: NORMAL
CLARITY UR: CLEAR
COLLECTION METHOD: NORMAL
CREATININE: 200
GLUCOSE UR-MCNC: NORMAL
HCG UR QL: 0.2 EU/DL
HGB UR QL STRIP.AUTO: NORMAL
KETONES UR-MCNC: NORMAL
LEUKOCYTE ESTERASE UR QL STRIP: NORMAL
MICROALBUMIN/CREAT UR TEST STR-RTO: <30
NITRITE UR QL STRIP: NORMAL
PH UR STRIP: 6
POCT - HEMOGLOBIN (HGB), QUANTITATIVE, TRANSCUTANEOUS: 13.6
PROT UR STRIP-MCNC: NORMAL
SP GR UR STRIP: 1.02

## 2025-08-01 PROCEDURE — 82044 UR ALBUMIN SEMIQUANTITATIVE: CPT | Mod: QW

## 2025-08-01 PROCEDURE — 88738 HGB QUANT TRANSCUTANEOUS: CPT

## 2025-08-01 PROCEDURE — 94010 BREATHING CAPACITY TEST: CPT

## 2025-08-01 PROCEDURE — 94729 DIFFUSING CAPACITY: CPT

## 2025-08-01 PROCEDURE — 95012 NITRIC OXIDE EXP GAS DETER: CPT

## 2025-08-01 PROCEDURE — 81003 URINALYSIS AUTO W/O SCOPE: CPT | Mod: QW

## 2025-08-01 PROCEDURE — 36415 COLL VENOUS BLD VENIPUNCTURE: CPT

## 2025-08-01 PROCEDURE — 99396 PREV VISIT EST AGE 40-64: CPT | Mod: 25

## 2025-08-01 PROCEDURE — 93000 ELECTROCARDIOGRAM COMPLETE: CPT

## 2025-08-01 PROCEDURE — 94727 GAS DIL/WSHOT DETER LNG VOL: CPT

## 2025-08-02 ENCOUNTER — NON-APPOINTMENT (OUTPATIENT)
Age: 60
End: 2025-08-02

## 2025-08-02 LAB
25(OH)D3 SERPL-MCNC: 19.6 NG/ML
ALBUMIN SERPL ELPH-MCNC: 4.5 G/DL
ALP BLD-CCNC: 82 U/L
ALT SERPL-CCNC: 27 U/L
ANION GAP SERPL CALC-SCNC: 10 MMOL/L
AST SERPL-CCNC: 20 U/L
BASOPHILS # BLD AUTO: 0.02 K/UL
BASOPHILS NFR BLD AUTO: 0.3 %
BILIRUB DIRECT SERPL-MCNC: 0.11 MG/DL
BILIRUB INDIRECT SERPL-MCNC: 0.1 MG/DL
BILIRUB SERPL-MCNC: 0.2 MG/DL
BUN SERPL-MCNC: 16 MG/DL
CALCIUM SERPL-MCNC: 9.8 MG/DL
CHLORIDE SERPL-SCNC: 104 MMOL/L
CHOLEST SERPL-MCNC: 252 MG/DL
CO2 SERPL-SCNC: 29 MMOL/L
CREAT SERPL-MCNC: 0.7 MG/DL
EGFRCR SERPLBLD CKD-EPI 2021: 98 ML/MIN/1.73M2
EOSINOPHIL # BLD AUTO: 0.2 K/UL
EOSINOPHIL NFR BLD AUTO: 2.8 %
ESTIMATED AVERAGE GLUCOSE: 108 MG/DL
GLUCOSE SERPL-MCNC: 97 MG/DL
HBA1C MFR BLD HPLC: 5.4 %
HCT VFR BLD CALC: 41.8 %
HCV AB SER QL: NONREACTIVE
HCV S/CO RATIO: 0.09 S/CO
HDLC SERPL-MCNC: 57 MG/DL
HGB BLD-MCNC: 13.1 G/DL
IMM GRANULOCYTES NFR BLD AUTO: 0.3 %
LDLC SERPL-MCNC: 179 MG/DL
LYMPHOCYTES # BLD AUTO: 2.25 K/UL
LYMPHOCYTES NFR BLD AUTO: 31.6 %
MAN DIFF?: NORMAL
MCHC RBC-ENTMCNC: 29.5 PG
MCHC RBC-ENTMCNC: 31.3 G/DL
MCV RBC AUTO: 94.1 FL
MONOCYTES # BLD AUTO: 0.47 K/UL
MONOCYTES NFR BLD AUTO: 6.6 %
NEUTROPHILS # BLD AUTO: 4.17 K/UL
NEUTROPHILS NFR BLD AUTO: 58.4 %
NONHDLC SERPL-MCNC: 196 MG/DL
PLATELET # BLD AUTO: 256 K/UL
POTASSIUM SERPL-SCNC: 4.7 MMOL/L
PROT SERPL-MCNC: 6.8 G/DL
RBC # BLD: 4.44 M/UL
RBC # FLD: 12.9 %
SODIUM SERPL-SCNC: 143 MMOL/L
T3 SERPL-MCNC: 109 NG/DL
T3RU NFR SERPL: 1.2 TBI
T4 FREE SERPL-MCNC: 1 NG/DL
T4 SERPL-MCNC: 8.1 UG/DL
TRIGL SERPL-MCNC: 95 MG/DL
TSH SERPL-ACNC: 2.11 UIU/ML
WBC # FLD AUTO: 7.13 K/UL

## 2025-08-13 ENCOUNTER — APPOINTMENT (OUTPATIENT)
Dept: CT IMAGING | Facility: CLINIC | Age: 60
End: 2025-08-13
Payer: COMMERCIAL

## 2025-08-13 ENCOUNTER — OUTPATIENT (OUTPATIENT)
Dept: OUTPATIENT SERVICES | Facility: HOSPITAL | Age: 60
LOS: 1 days | End: 2025-08-13
Payer: COMMERCIAL

## 2025-08-13 DIAGNOSIS — E78.5 HYPERLIPIDEMIA, UNSPECIFIED: ICD-10-CM

## 2025-08-13 DIAGNOSIS — Z00.8 ENCOUNTER FOR OTHER GENERAL EXAMINATION: ICD-10-CM

## 2025-08-13 PROCEDURE — 75571 CT HRT W/O DYE W/CA TEST: CPT | Mod: 26

## 2025-08-13 PROCEDURE — 75571 CT HRT W/O DYE W/CA TEST: CPT

## 2025-08-14 ENCOUNTER — NON-APPOINTMENT (OUTPATIENT)
Age: 60
End: 2025-08-14

## 2025-08-14 DIAGNOSIS — E78.5 HYPERLIPIDEMIA, UNSPECIFIED: ICD-10-CM

## 2025-08-14 RX ORDER — EZETIMIBE 10 MG/1
10 TABLET ORAL
Qty: 30 | Refills: 5 | Status: ACTIVE | COMMUNITY
Start: 2025-08-14 | End: 1900-01-01